# Patient Record
Sex: FEMALE | Race: WHITE | Employment: OTHER | URBAN - METROPOLITAN AREA
[De-identification: names, ages, dates, MRNs, and addresses within clinical notes are randomized per-mention and may not be internally consistent; named-entity substitution may affect disease eponyms.]

---

## 2018-01-09 ENCOUNTER — HOSPITAL ENCOUNTER (EMERGENCY)
Age: 68
Discharge: HOME | End: 2018-01-09
Payer: MEDICARE

## 2018-01-09 ENCOUNTER — APPOINTMENT (OUTPATIENT)
Dept: GENERAL RADIOLOGY | Age: 68
End: 2018-01-09
Payer: MEDICARE

## 2018-01-09 VITALS
SYSTOLIC BLOOD PRESSURE: 120 MMHG | TEMPERATURE: 98.6 F | RESPIRATION RATE: 16 BRPM | WEIGHT: 175 LBS | HEIGHT: 64 IN | HEART RATE: 86 BPM | OXYGEN SATURATION: 96 % | DIASTOLIC BLOOD PRESSURE: 83 MMHG | BODY MASS INDEX: 29.88 KG/M2

## 2018-01-09 DIAGNOSIS — J10.1 INFLUENZA A: Primary | ICD-10-CM

## 2018-01-09 LAB
RAPID INFLUENZA  B AGN: NEGATIVE
RAPID INFLUENZA A AGN: POSITIVE

## 2018-01-09 PROCEDURE — 94640 AIRWAY INHALATION TREATMENT: CPT

## 2018-01-09 PROCEDURE — 99283 EMERGENCY DEPT VISIT LOW MDM: CPT

## 2018-01-09 PROCEDURE — 71046 X-RAY EXAM CHEST 2 VIEWS: CPT

## 2018-01-09 PROCEDURE — 86403 PARTICLE AGGLUT ANTBDY SCRN: CPT

## 2018-01-09 PROCEDURE — 6370000000 HC RX 637 (ALT 250 FOR IP): Performed by: PHYSICIAN ASSISTANT

## 2018-01-09 PROCEDURE — 94760 N-INVAS EAR/PLS OXIMETRY 1: CPT

## 2018-01-09 RX ORDER — GUAIFENESIN AND CODEINE PHOSPHATE 100; 10 MG/5ML; MG/5ML
5 SOLUTION ORAL 3 TIMES DAILY PRN
Qty: 118 ML | Refills: 0 | Status: SHIPPED | OUTPATIENT
Start: 2018-01-09 | End: 2018-01-16

## 2018-01-09 RX ORDER — BENZONATATE 100 MG/1
100 CAPSULE ORAL 3 TIMES DAILY PRN
Qty: 15 CAPSULE | Refills: 0 | Status: SHIPPED | OUTPATIENT
Start: 2018-01-09 | End: 2018-01-16

## 2018-01-09 RX ORDER — MELOXICAM 15 MG/1
15 TABLET ORAL DAILY
COMMUNITY
End: 2021-04-23

## 2018-01-09 RX ORDER — IPRATROPIUM BROMIDE AND ALBUTEROL SULFATE 2.5; .5 MG/3ML; MG/3ML
1 SOLUTION RESPIRATORY (INHALATION) ONCE
Status: COMPLETED | OUTPATIENT
Start: 2018-01-09 | End: 2018-01-09

## 2018-01-09 RX ORDER — ALBUTEROL SULFATE 90 UG/1
AEROSOL, METERED RESPIRATORY (INHALATION)
Qty: 1 INHALER | Refills: 1 | Status: SHIPPED | OUTPATIENT
Start: 2018-01-09 | End: 2021-04-23

## 2018-01-09 RX ORDER — PREDNISONE 10 MG/1
20 TABLET ORAL 2 TIMES DAILY
Qty: 20 TABLET | Refills: 0 | Status: SHIPPED | OUTPATIENT
Start: 2018-01-09 | End: 2018-01-14

## 2018-01-09 RX ORDER — PREDNISONE 20 MG/1
60 TABLET ORAL ONCE
Status: COMPLETED | OUTPATIENT
Start: 2018-01-09 | End: 2018-01-09

## 2018-01-09 RX ADMIN — PREDNISONE 60 MG: 20 TABLET ORAL at 09:35

## 2018-01-09 RX ADMIN — IPRATROPIUM BROMIDE AND ALBUTEROL SULFATE 1 AMPULE: .5; 3 SOLUTION RESPIRATORY (INHALATION) at 09:18

## 2018-01-09 ASSESSMENT — ENCOUNTER SYMPTOMS
NAUSEA: 0
VOMITING: 0
DIARRHEA: 0
ABDOMINAL PAIN: 0
COUGH: 1
SHORTNESS OF BREATH: 0

## 2018-01-09 ASSESSMENT — PULMONARY FUNCTION TESTS: PEFR_L/MIN: 270

## 2018-01-09 NOTE — ED NOTES
Pt given discharge instructions, states understanding, ambulated to exit for discharge     Raciel Barga RN  01/09/18 1014

## 2018-01-09 NOTE — ED NOTES
Reassessed pt lung sounds after breathing tx, exp wheeze noted left lower lung, pt states that she feels a little better and less wheezy after tx     Rene Muhammad RN  01/09/18 2048

## 2019-01-25 ENCOUNTER — HOSPITAL ENCOUNTER (OUTPATIENT)
Dept: WOMENS IMAGING | Age: 69
Discharge: HOME | End: 2019-01-27
Payer: MEDICARE

## 2019-01-25 DIAGNOSIS — Z12.31 ENCOUNTER FOR SCREENING MAMMOGRAM FOR BREAST CANCER: ICD-10-CM

## 2019-01-25 PROCEDURE — 77063 BREAST TOMOSYNTHESIS BI: CPT

## 2019-07-10 LAB
ALT SERPL-CCNC: 26 U/L (ref 7–45)
AST SERPL-CCNC: 23 U/L (ref 9–39)
CHOLESTEROL/HDL RATIO: 2.5
CHOLESTEROL: 156 MG/DL (ref 0–199)
HDLC SERPL-MCNC: 62 MG/DL
LDL CHOLESTEROL: 72 MG/DL (ref 0–99)
TRIGL SERPL-MCNC: 111 MG/DL (ref 0–149)
VLDLC SERPL CALC-MCNC: 22 MG/DL (ref 0–40)

## 2019-07-25 LAB
CREAT SERPL-MCNC: 0.79 MG/DL (ref 0.5–1)
GFR AFRICAN AMERICAN: >60 ML/MIN/1.73M2
GFR NON-AFRICAN AMERICAN: >60 ML/MIN/1.73M2
UREA NITROGEN: 22 MG/DL (ref 6–23)

## 2021-04-13 ENCOUNTER — HOSPITAL ENCOUNTER (EMERGENCY)
Age: 71
Discharge: HOME | End: 2021-04-13
Payer: MEDICARE

## 2021-04-13 ENCOUNTER — APPOINTMENT (OUTPATIENT)
Dept: GENERAL RADIOLOGY | Age: 71
End: 2021-04-13
Payer: MEDICARE

## 2021-04-13 VITALS
HEART RATE: 77 BPM | SYSTOLIC BLOOD PRESSURE: 161 MMHG | RESPIRATION RATE: 17 BRPM | BODY MASS INDEX: 35 KG/M2 | DIASTOLIC BLOOD PRESSURE: 85 MMHG | HEIGHT: 64 IN | WEIGHT: 205 LBS | TEMPERATURE: 98.1 F | OXYGEN SATURATION: 95 %

## 2021-04-13 DIAGNOSIS — S81.812A LACERATION OF LEFT LOWER LEG, INITIAL ENCOUNTER: Primary | ICD-10-CM

## 2021-04-13 PROCEDURE — 99283 EMERGENCY DEPT VISIT LOW MDM: CPT

## 2021-04-13 PROCEDURE — 6370000000 HC RX 637 (ALT 250 FOR IP): Performed by: NURSE PRACTITIONER

## 2021-04-13 PROCEDURE — 73590 X-RAY EXAM OF LOWER LEG: CPT

## 2021-04-13 PROCEDURE — 6360000002 HC RX W HCPCS: Performed by: NURSE PRACTITIONER

## 2021-04-13 PROCEDURE — 90471 IMMUNIZATION ADMIN: CPT | Performed by: NURSE PRACTITIONER

## 2021-04-13 PROCEDURE — 90715 TDAP VACCINE 7 YRS/> IM: CPT | Performed by: NURSE PRACTITIONER

## 2021-04-13 PROCEDURE — 12001 RPR S/N/AX/GEN/TRNK 2.5CM/<: CPT

## 2021-04-13 PROCEDURE — 2580000003 HC RX 258: Performed by: NURSE PRACTITIONER

## 2021-04-13 RX ORDER — AMOXICILLIN AND CLAVULANATE POTASSIUM 875; 125 MG/1; MG/1
1 TABLET, FILM COATED ORAL ONCE
Status: COMPLETED | OUTPATIENT
Start: 2021-04-13 | End: 2021-04-13

## 2021-04-13 RX ORDER — MAGNESIUM HYDROXIDE 1200 MG/15ML
1000 LIQUID ORAL ONCE
Status: COMPLETED | OUTPATIENT
Start: 2021-04-13 | End: 2021-04-13

## 2021-04-13 RX ORDER — AMOXICILLIN AND CLAVULANATE POTASSIUM 875; 125 MG/1; MG/1
1 TABLET, FILM COATED ORAL 2 TIMES DAILY
Qty: 14 TABLET | Refills: 0 | Status: SHIPPED | OUTPATIENT
Start: 2021-04-13 | End: 2021-04-20

## 2021-04-13 RX ADMIN — AMOXICILLIN AND CLAVULANATE POTASSIUM 1 TABLET: 875; 125 TABLET, FILM COATED ORAL at 11:14

## 2021-04-13 RX ADMIN — TETANUS TOXOID, REDUCED DIPHTHERIA TOXOID AND ACELLULAR PERTUSSIS VACCINE, ADSORBED 0.5 ML: 5; 2.5; 8; 8; 2.5 SUSPENSION INTRAMUSCULAR at 11:14

## 2021-04-13 RX ADMIN — Medication 1 EACH: at 11:15

## 2021-04-13 RX ADMIN — SODIUM CHLORIDE 1000 ML: 900 IRRIGANT IRRIGATION at 11:15

## 2021-04-13 ASSESSMENT — ENCOUNTER SYMPTOMS
COUGH: 0
VOMITING: 0
BACK PAIN: 0
ABDOMINAL PAIN: 0
NAUSEA: 0
DIARRHEA: 0
CHEST TIGHTNESS: 0
SORE THROAT: 0
COLOR CHANGE: 0
TROUBLE SWALLOWING: 0
ABDOMINAL DISTENTION: 0
SHORTNESS OF BREATH: 0
SINUS PRESSURE: 0
SINUS PAIN: 0
RHINORRHEA: 0
WHEEZING: 0

## 2021-04-13 NOTE — ED TRIAGE NOTES
Pt states that she was breaking up a wooden flower pot when a piece of it went into her left shin. Pt denies pain but thinks she should get an abx and does not remember the last time she has received a tetanus vaccine.

## 2021-04-13 NOTE — ED PROVIDER NOTES
[facility] ED  EMERGENCY DEPARTMENT ENCOUNTER      Pt Name: Mikki Amaya  MRN: [id number]  [place] 1950  Date of evaluation: 4/13/2021  Provider: JOSTIN Palmer CNP    CHIEF COMPLAINT       Chief Complaint   Patient presents with    Puncture Wound     got a puncture wound from a piece of wood yesterday to lle         HISTORY OF PRESENT ILLNESS   (Location/Symptom, Timing/Onset,Context/Setting, Quality, Duration, Modifying Factors, Severity)  Note limiting factors. Mikki Amaya is a 65 y.o. female who presents to the emergency department for complaint of left lower extremity wound. Patient states that around 5 PM last night she is breaking apart a piece of rotted wood when part of the wood struck her in her shin causing a wound. She states she pulled out a piece of splinters and was able to thoroughly wash the area last night. She states that she did have some bruising and swelling which has gone down this morning. She states that she does have some pain discomfort when the area is pressed on but otherwise there is only a general dull aching very mild in nature. She states that she was able to get most of bleeding controlled. She did wash the area last night flushed it under running water repeatedly. She is concerned because she cannot recall the last time she had a tetanus shot and is afraid that since the area is open that she needs an antibiotic. She is not diabetic. Only small amount of bleeding today into the bandaged area. She states that the swelling and bruising has significantly improved as well as the pain. She presents to the ER for evaluation of the wound a tetanus update and antibiotic medication. Nursing Notes were reviewed. REVIEW OF SYSTEMS    (2-9 systems for level 4, 10 or more for level 5)     Review of Systems   Constitutional: Negative for activity change, appetite change, chills, diaphoresis, fatigue and fever.    HENT: Negative for congestion, ear pain, postnasal drip, rhinorrhea, sinus pressure, sinus pain, sore throat and trouble swallowing. Eyes: Negative for visual disturbance. Respiratory: Negative for cough, chest tightness, shortness of breath and wheezing. Cardiovascular: Negative for chest pain and palpitations. Gastrointestinal: Negative for abdominal distention, abdominal pain, diarrhea, nausea and vomiting. Genitourinary: Negative for difficulty urinating, dysuria, flank pain, frequency and urgency. Musculoskeletal: Positive for myalgias. Negative for arthralgias, back pain, gait problem, joint swelling, neck pain and neck stiffness. Skin: Positive for wound. Negative for color change and rash. Neurological: Negative for dizziness, tremors, seizures, syncope, speech difficulty, weakness, light-headedness, numbness and headaches. Except as noted above the remainder of the review of systems was reviewed and negative. PAST MEDICAL HISTORY     Past Medical History:   Diagnosis Date    Cancer [facility]) 2008    Bladder    Encephalitis 1992    Hyperlipidemia     Osteoporosis     Shingles 1992     Past Surgical History:   Procedure Laterality Date    APPENDECTOMY  1982    COLONOSCOPY  May 14, 2012    screening by Dr Frederick Shankar  2010 2010, cataract, R,  LASIK on both eye, 2007    [facility]    Total    [facility]NEE [address] [facility]    left knee    LASIK  2007    both eyes    LUMBAR FUSION      SPINE SURGERY  89, 92    Lumbar, [facility], [facility]    PERCY AND BSO  1983    fibroids, nonmalignant.     TUBAL LIGATION  1975     Social History     Socioeconomic History    Marital status:      Spouse name: Not on file    Number of children: Not on file    Years of education: Not on file    Highest education level: Not on file   Occupational History    Not on file   Social Needs    Financial resource strain: Not on file    Food insecurity     Worry: Not on file Inability: Not on file    Transportation needs     Medical: Not on file     Non-medical: Not on file   Tobacco Use    Smoking status: Former Smoker     Packs/day: 1.50     Years: 15.00     Pack years: 22.50     Types: Cigarettes     Quit date: 1983     Years since quittin.3    Smokeless tobacco: Never Used   Substance and Sexual Activity    Alcohol use: No    Drug use: No    Sexual activity: Never   Lifestyle    Physical activity     Days per week: Not on file     Minutes per session: Not on file    Stress: Not on file   Relationships    Social connections     Talks on phone: Not on file     Gets together: Not on file     Attends [employer] service: Not on file     Active member of club or organization: Not on file     Attends meetings of clubs or organizations: Not on file     Relationship status: Not on file    Intimate partner violence     Fear of current or ex partner: Not on file     Emotionally abused: Not on file     Physically abused: Not on file     Forced sexual activity: Not on file   Other Topics Concern    Not on file   Social History Narrative    Not on file       SCREENINGS             PHYSICAL EXAM    (up to 7 for level 4, 8 or more for level 5)     ED Triage Vitals [21 1027]   BP Temp Temp Source Pulse Resp SpO2 Height Weight   (!) 161/85 98.1 °F (36.7 °C) Temporal 77 17 95 % 5' 4\" (1.626 m) 205 lb (93 kg)       Physical Exam  Constitutional:       General: She is not in acute distress. Appearance: Normal appearance. She is normal weight. She is not ill-appearing, toxic-appearing or diaphoretic. HENT:      Head: Normocephalic and atraumatic. Right Ear: External ear normal.      Left Ear: External ear normal.   Eyes:      General:         Right eye: No discharge. Left eye: No discharge. Conjunctiva/sclera: Conjunctivae normal.      Pupils: Pupils are equal, round, and reactive to light.    Neck:      Musculoskeletal: Normal range of motion and neck supple. No neck rigidity or muscular tenderness. Cardiovascular:      Rate and Rhythm: Normal rate and regular rhythm. Pulses: Normal pulses. Pulmonary:      Effort: Pulmonary effort is normal.   Musculoskeletal: Normal range of motion. General: Tenderness and signs of injury present. No swelling or deformity. Left knee: Normal.      Left ankle: Normal.        Legs:    Skin:     General: Skin is warm. Capillary Refill: Capillary refill takes less than 2 seconds. Findings: Laceration present. Neurological:      General: No focal deficit present. Mental Status: She is alert. Mental status is at baseline. Cranial Nerves: No cranial nerve deficit. Sensory: No sensory deficit. Motor: No weakness. Coordination: Coordination normal.         RESULTS     EKG: All EKG's are interpreted by the Emergency Department Physician who either signs or Co-signsthis chart in the absence of a cardiologist.        RADIOLOGY:   Vanesa Hernandez such as CT, Ultrasound and MRI are read by the radiologist. Plain radiographic images are visualized and preliminarily interpreted by the emergency physician with the below findings:        Interpretation per the Radiologist below, if available at the time ofthis note:    XR TIBIA FIBULA LEFT (2 VIEWS)   Final Result      No acute osseous abnormality. No radiopaque soft tissue foreign body. ED BEDSIDE ULTRASOUND:   Performed by ED Physician - none    LABS:  Labs Reviewed - No data to display    All other labs were within normal range or not returned as of this dictation. EMERGENCY DEPARTMENT COURSE and DIFFERENTIAL DIAGNOSIS/MDM:   Vitals:    Vitals:    04/13/21 1027   BP: (!) 161/85   Pulse: 77   Resp: 17   Temp: 98.1 °F (36.7 °C)   TempSrc: Temporal   SpO2: 95%   Weight: 205 lb (93 kg)   Height: 5' 4\" (1.626 m)            MDM patient afebrile nontoxic no acute distress hemodynamically stable.   Patient has an extent: no areolar tissue violation noted, no fascia violation noted, no foreign bodies/material noted, no muscle damage noted, no nerve damage noted, no tendon damage noted, no underlying fracture noted and no vascular damage noted      Contaminated: no    Treatment:     Area cleansed with:  Soap and water and saline    Amount of cleaning:  Standard    Irrigation solution:  Sterile saline    Irrigation volume:  1000    Irrigation method:  Pressure wash    Visualized foreign bodies/material removed: no    Skin repair:     Repair method:  Tissue adhesive  Approximation:     Approximation:  Loose  Post-procedure details:     Dressing:  Non-adherent dressing and bulky dressing    Patient tolerance of procedure: Tolerated well, no immediate complications        FINAL IMPRESSION      1.  Laceration of left lower leg, initial encounter          DISPOSITION/PLAN   DISPOSITION        PATIENT REFERRED TO:  [address] [address] [phone]  Call in 2 days  For wound re-check      DISCHARGE MEDICATIONS:  New Prescriptions    AMOXICILLIN-CLAVULANATE (AUGMENTIN) 875-125 MG PER TABLET    Take 1 tablet by mouth 2 times daily for 7 days          (Please notethat portions of this note were completed with a voice recognition program.  Efforts were made to edit the dictations but occasionally words are mis-transcribed.)    JOSTIN Navarrete CNP (electronically signed)  Attending Emergency Physician         JOSTIN Navarrete CNP  04/13/21 5360

## 2021-04-23 ENCOUNTER — HOSPITAL ENCOUNTER (OUTPATIENT)
Dept: WOUND CARE | Age: 71
Discharge: HOME | End: 2021-04-23
Payer: MEDICARE

## 2021-04-23 VITALS
HEART RATE: 98 BPM | RESPIRATION RATE: 16 BRPM | TEMPERATURE: 97.3 F | SYSTOLIC BLOOD PRESSURE: 153 MMHG | DIASTOLIC BLOOD PRESSURE: 83 MMHG

## 2021-04-23 DIAGNOSIS — L02.415 CELLULITIS AND ABSCESS OF RIGHT LEG: ICD-10-CM

## 2021-04-23 DIAGNOSIS — S81.831A: ICD-10-CM

## 2021-04-23 DIAGNOSIS — L03.115 CELLULITIS AND ABSCESS OF RIGHT LEG: ICD-10-CM

## 2021-04-23 PROCEDURE — 11042 DBRDMT SUBQ TIS 1ST 20SQCM/<: CPT

## 2021-04-23 PROCEDURE — 99203 OFFICE O/P NEW LOW 30 MIN: CPT | Performed by: SURGERY

## 2021-04-23 PROCEDURE — 11042 DBRDMT SUBQ TIS 1ST 20SQCM/<: CPT | Performed by: SURGERY

## 2021-04-23 PROCEDURE — 99213 OFFICE O/P EST LOW 20 MIN: CPT

## 2021-04-23 RX ORDER — AMOXICILLIN AND CLAVULANATE POTASSIUM 875; 125 MG/1; MG/1
1 TABLET, FILM COATED ORAL 2 TIMES DAILY
Qty: 28 TABLET | Refills: 0 | Status: SHIPPED | OUTPATIENT
Start: 2021-04-23 | End: 2021-05-07

## 2021-04-23 RX ORDER — CYCLOSPORINE 0 G/ML
2 SOLUTION/ DROPS OPHTHALMIC; TOPICAL
COMMUNITY

## 2021-04-23 NOTE — PROGRESS NOTES
[facility]                                                   Progress Note and Procedure Note      Cong Dawson  MEDICAL RECORD NUMBER:  [id number]  AGE: 65 y.o. GENDER: female  : 1950  EPISODE DATE:  2021    Subjective:     Chief Complaint   Patient presents with    Wound Check     left lower leg          HISTORY of PRESENT ILLNESS HPI     Cong Dawson is a 65 y.o. female who presents today for wound/ulcer evaluation. History of Wound Context: Patient had a puncture wound from a wooden planter on . She presented to the ER  due to pain at the puncture site. Xray was negative. She was started on abx. She states today that the erythema is improving, but she is out of abx. No fever or chills    Wound/Ulcer Pain Timing/Severity: none  Quality of pain: N/A  Severity:  0 / 10   Modifying Factors: None  Associated Signs/Symptoms: erythema and drainage    Ulcer Identification:  Ulcer Type: traumatic  Contributing Factors: obesity    Wound: N/A        PAST MEDICAL HISTORY        Diagnosis Date    Cancer [facility])     Bladder    Encephalitis     Hyperlipidemia     Osteoporosis     Shingles        PAST SURGICAL HISTORY    Past Surgical History:   Procedure Laterality Date    APPENDECTOMY      COLONOSCOPY  May 14, 2012    screening by Dr Melbourne Kehr  2010, cataract, R,  LASIK on both eye,    [address] [facility] [place] [place]    left knee    LASIK  2007    both eyes    LUMBAR FUSION      SPINE SURGERY  89, 92    Lumbar, , Andrea    PERCY AND BSO      fibroids, nonmalignant.  TUBAL LIGATION         FAMILY HISTORY    History reviewed. No pertinent family history.     SOCIAL HISTORY    Social History     Tobacco Use    Smoking status: Former Smoker     Packs/day: 1.50     Years: 15.00     Pack years: 22.50     Types: Cigarettes     Quit date: 1983     Years since quittin.3  Smokeless tobacco: Never Used   Substance Use Topics    Alcohol use: No    Drug use: No       ALLERGIES    No Known Allergies    MEDICATIONS    Current Outpatient Medications on File Prior to Encounter   Medication Sig Dispense Refill    cycloSPORINE, PF, (CEQUA) 0.09 % SOLN Apply 2 drops to eye 2 drops each eye daily      atorvastatin (LIPITOR) 20 MG tablet Take 20 mg by mouth daily. No current facility-administered medications on file prior to encounter. REVIEW OF SYSTEMS    Constitutional: negative  Respiratory: negative  Cardiovascular: negative  Allergic/Immunologic: negative    Objective:      BP (!) 153/83   Pulse 98   Temp 97.3 °F (36.3 °C) (Temporal)   Resp 16     Wt Readings from Last 3 Encounters:   04/13/21 205 lb (93 kg)   01/09/18 175 lb (79.4 kg)   08/04/14 193 lb (87.5 kg)       PHYSICAL EXAM    Constitutional:   Well nourished and well developed. Appears neat and clean. Patient is alert, oriented x3, and in no apparent distress. Respiratory:  Respiratory effort is easy and symmetric bilaterally. Rate is normal at rest and on room air. Vascular:  Pedal Pulses are palpable. Capillary refill is <3 sec to digits bilateral.  Extremities negative for pitting edema. Neurological:   Sensation is normal to lower extremities. Dermatological:  Wound description noted in wound assessment. Wound with surrounding cellulitis. Some drainage at the site. Psychiatric:  Judgement and insight intact. Short and long term memory intact. No evidence of depression, anxiety, or agitation. Patient is calm, cooperative, and communicative. Appropriate interactions and affect.         Assessment:      Active Hospital Problems    Diagnosis Date Noted    Cellulitis and abscess of right leg [L03.115, L02.415] 04/23/2021    Puncture wound w/o foreign body, right lower leg, init [S81.831A] 04/23/2021        Procedure Note  Indications:  Based on my examination of this patient's wound(s)/ulcer(s) today, debridement is required to promote healing and evaluate the wound base. Performed by: Jessica Iraheta MD    Consent obtained:  Yes    Time out taken:  Yes    Pain Control:     Lidocaine 4%    Debridement:Excisional Debridement    Using curette, scissors and forceps the wound(s)/ulcer(s) was/were sharply debrided down through and including the removal of epidermis, dermis and subcutaneous tissue. Devitalized Tissue Debrided:  fibrin, biofilm, slough and necrotic/eschar    Pre Debridement Measurements:  Are located in the [place]  Documentation Flow Sheet    Wound/Ulcer #: 1    Post Debridement Measurements:  Wound/Ulcer Descriptions are Pre Debridement except measurements:    Wound 04/23/21 Leg Left; Lower #1 (Active)   Wound Image   04/23/21 1506   Wound Etiology Traumatic 04/23/21 1506   Wound Cleansed Cleansed with saline 04/23/21 1506   Wound Length (cm) 1.7 cm 04/23/21 1506   Wound Width (cm) 1.7 cm 04/23/21 1506   Wound Depth (cm) 0.1 cm 04/23/21 1506   Wound Surface Area (cm^2) 2.89 cm^2 04/23/21 1506   Wound Volume (cm^3) 0.29 cm^3 04/23/21 1506   Post-Procedure Length (cm) 1.7 cm 04/23/21 1535   Post-Procedure Width (cm) 1.7 cm 04/23/21 1535   Post-Procedure Depth (cm) 0.1 cm 04/23/21 1535   Post-Procedure Surface Area (cm^2) 2.89 cm^2 04/23/21 1535   Post-Procedure Volume (cm^3) 0.29 cm^3 04/23/21 1535   Drainage Amount Small 04/23/21 1506   Drainage Description Serosanguinous 04/23/21 1506   Odor None 04/23/21 1506   Светлана-wound Assessment Intact;Fragile; Warm 04/23/21 1506   Margins Attached edges 04/23/21 1506   Wound Thickness Description not for Pressure Injury Full thickness 04/23/21 1506   Number of days: 0            Percent of Wound/Ulcer Debrided: 100%    Total Surface Area Debrided:    Wound Surface Area (cm^2) 2.89 cm^2     Diabetic/Pressure/Non Pressure Ulcers:  Ulcer: Non-Pressure ulcer, fat layer exposed      Bleeding:  Minimal    Hemostasis Achieved:  by silver nitrate stick    Procedural Pain:  0  / 10     Post Procedural Pain:  0 / 10     Response to treatment:  Well tolerated by patient. Plan:     Cellulitis around puncture wound  Ulcer debrided  rx given for Augmentin  F/u 1 wk. Treatment Note please see attached Discharge Instructions    Written patient dismissal instructions given to patient and signed by patient or POA. Discharge [address] and [facility] Medicine   Physician Orders and Discharge Instructions  [address]  Telephone: [phone]      [phone]      NAME:  Alexis Cheng  YOB: 1950  MEDICAL RECORD NUMBER:  [id number]    Home Care/Facility: None    Wound Location: Left lower leg    Dressing orders: May cleanse with soap and water    2. Apply Cutimed siltec sorbact bordered gauze. 3 Change  Every other day or Monday, Wednesday, and Friday      Compression:    Offloading Device:    Other Instructions: An Antibiotic was called today to your pharmacy - please take as prescribed    Keep all dressings clean, dry and intact. Keep pressure off the wound(s) at all times. Follow up visit 1  Week April 30, 2021 @ 2:15pm    Please give 24 hour notice if unable to keep appointment. [phone]    If you experience any of the following, please call the Wound Care Service at  [phone] or go to the nearest emergency room. *Increase in pain *Temperature over 101 *Increase in drainage from your wound or a foul odor  *Uncontrolled swelling *Need for compression bandage changes due to slippage, breakthrough drainage       PLEASE NOTE: IF YOU ARE UNABLE TO OBTAIN WOUND SUPPLIES, CONTINUE TO USE THE SUPPLIES YOU HAVE AVAILABLE UNTIL YOU ARE ABLE TO REACH US.  IT IS MOST IMPORTANT TO KEEP THE WOUND COVERED AT ALL TIMES        Electronically signed by Kasandra Marino MD on 4/23/2021 at 3:49

## 2021-04-30 ENCOUNTER — HOSPITAL ENCOUNTER (OUTPATIENT)
Dept: WOUND CARE | Age: 71
Discharge: HOME | End: 2021-04-30
Payer: MEDICARE

## 2021-04-30 VITALS
SYSTOLIC BLOOD PRESSURE: 149 MMHG | TEMPERATURE: 97.1 F | HEART RATE: 72 BPM | DIASTOLIC BLOOD PRESSURE: 77 MMHG | RESPIRATION RATE: 18 BRPM

## 2021-04-30 PROCEDURE — 97597 DBRDMT OPN WND 1ST 20 CM/<: CPT

## 2021-04-30 PROCEDURE — 6370000000 HC RX 637 (ALT 250 FOR IP): Performed by: NURSE PRACTITIONER

## 2021-04-30 RX ORDER — LIDOCAINE 40 MG/G
CREAM TOPICAL PRN
Status: DISCONTINUED | OUTPATIENT
Start: 2021-04-30 | End: 2021-05-01 | Stop reason: HOSPADM

## 2021-04-30 RX ADMIN — LIDOCAINE: 40 CREAM TOPICAL at 14:55

## 2021-04-30 ASSESSMENT — PAIN DESCRIPTION - PAIN TYPE: TYPE: ACUTE PAIN

## 2021-04-30 ASSESSMENT — PAIN DESCRIPTION - DESCRIPTORS: DESCRIPTORS: SORE

## 2021-04-30 ASSESSMENT — PAIN DESCRIPTION - FREQUENCY: FREQUENCY: INTERMITTENT

## 2021-04-30 NOTE — PROGRESS NOTES
[facility] Physician Billing Sheet. Shahbaz Danielle  AGE: 65 y.o.    GENDER: female  : 1950  TODAY'S DATE:  2021    ICD-10  [facility] Problems    Diagnosis Date Noted    Puncture wound w/o foreign body, right lower leg, init [S81.831A] 2021       PHYSICIAN PROCEDURES    CPT CODE  51274  05193      Electronically signed by JOSTIN Mohr NP on 2021 at 3:46 PM

## 2021-04-30 NOTE — FLOWSHEET NOTE
[facility],[address]:     [employer] [id number] [facility] [address] [place] [address] [facility] [place] p: [phone] f: [phone]     Ordering Center:     [address] [place] [address]  Abdi Ramirez [phone]  FAX NUMBER [phone]    Patient Information:      Mela Romberg [address]  [facility] [address]   [phone]   : 1950  AGE: 65 y.o. GENDER: female   TODAYS DATE:  2021    Insurance:      PRIMARY INSURANCE:  Plan: MEDICARE PART A AND B  Coverage: MEDICARE  Effective Date: 2015  [id number] - (Medicare)    SECONDARY INSURANCE:  Plan: MEDICAL MUTUAL [address]  Coverage: MEDICAL MUTUAL  Effective Date: 2015  [unfilled]    [unfilled]   [unfilled]     Patient Wound Information:      Problem List Items Addressed This Visit     None       ICD-10 S81.831A      WOUNDS REQUIRING DRESSING SUPPLIES:     Wound 21 Leg Left; Lower #1 (Active)   Wound Image   21 1423   Wound Etiology Traumatic 21 1423   Wound Cleansed Cleansed with saline 21 1423   Dressing/Treatment Cutimed/Sorbact 21 1515   Wound Length (cm) 1.5 cm 21 1423   Wound Width (cm) 1.5 cm 21 1423   Wound Depth (cm) 0.2 cm 21 1423   Wound Surface Area (cm^2) 2.25 cm^2 21 1423   Change in Wound Size % (l*w) 22.15 21 1423   Wound Volume (cm^3) 0.45 cm^3 21 1423   Wound Healing % -55 21 1423   Post-Procedure Length (cm) 1.5 cm 21 1458   Post-Procedure Width (cm) 1.5 cm 21 1458   Post-Procedure Depth (cm) 0.2 cm 21 1458   Post-Procedure Surface Area (cm^2) 2.25 cm^2 21 1458   Post-Procedure Volume (cm^3) 0.45 cm^3 21 1458   Undermining Starts ___ O'Clock 11 21 1423   Undermining Ends___ O'Clock 12 21 1423   Undermining Maxium Distance (cm) 0.2 21 1423   Wound Assessment Slough;Granulation tissue 21 1423   Drainage Amount Moderate 04/30/21 1423   Drainage Description Serosanguinous 04/30/21 1423   Odor None 04/30/21 1423   Светлана-wound Assessment Intact 04/30/21 1423   Margins Defined edges 04/30/21 1423   Wound Thickness Description not for Pressure Injury Full thickness 04/30/21 1423   Number of days: 7          Supplies Requested :      WOUND #: 1   PRIMARY DRESSING:  Other: cutimed Siltec sorbact bordered dressing 3\"x3\"   Cover and Secure with:  Other none     FREQUENCY OF DRESSING CHANGES:  Every other day           ADDITIONAL ITEMS:  [] Gloves Small  [x] Gloves Medium [] Gloves Large [] Gloves XLarge  [] Tape 1\" [x] Tape 2\" [] Tape 3\"  [] Medipore Tape  [x] Saline  [] Skin Prep   [] Adhesive Remover   [] Cotton Tip Applicators   [] Other:    Patient Wound(s) Debrided: [x] Yes   [] No    Debribement Type: dermis    Debridement Date: 4/30/21    Patient currently being seen by Home Health: [] Yes   [x] No    Duration for needed supplies:  []15  [x]30  []60  []90 Days    Provider Information:      PROVIDER'S NAME: Kasandra Marino MD    NPI  [id number]

## 2021-04-30 NOTE — PROGRESS NOTES
Geovanna Moe 37                                                   Progress Note and Procedure Note      Frederick Delcid  MEDICAL RECORD NUMBER:  [id number]  AGE: 65 y.o. GENDER: female  : 1950  EPISODE DATE:  2021    Subjective:     Chief Complaint   Patient presents with    Wound Check     left lower leg         HISTORY of PRESENT ILLNESS HPI     Frederick Delcid is a 65 y.o. female who presents today for wound/ulcer evaluation. History of Wound Context: Patient had a puncture wound from a wooden planter on . She presented to the ER  due to pain at the puncture site. Xray was negative. She was started on abx. Currently on second time for antibiotics. Minimal erythema No drainage or odor today  Wound/Ulcer Pain Timing/Severity: none  Quality of pain: N/A  Severity:  0 / 10   Modifying Factors: None  Associated Signs/Symptoms: erythema and drainage    Ulcer Identification:  Ulcer Type: traumatic  Contributing Factors: obesity    Wound: N/A        PAST MEDICAL HISTORY        Diagnosis Date    Cancer [facility])     Bladder    Encephalitis     Hyperlipidemia     Osteoporosis     Shingles        PAST SURGICAL HISTORY    Past Surgical History:   Procedure Laterality Date    APPENDECTOMY      COLONOSCOPY  May 14, 2012    screening by Dr Mini Crawley  2010, cataract, R,  LASIK on both eye,    [address] [facility]    Total    KNEE [address]    left knee    LASIK      both eyes    LUMBAR FUSION      SPINE SURGERY  89, 92    Lumbar, , Andrea    PERCY AND BSO      fibroids, nonmalignant.  TUBAL LIGATION         FAMILY HISTORY    History reviewed. No pertinent family history.     SOCIAL HISTORY    Social History     Tobacco Use    Smoking status: Former Smoker     Packs/day: 1.50     Years: 15.00     Pack years: 22.50     Types: Cigarettes     Quit date: 1983     Years since quittin.3    Smokeless tobacco: Never Used   Substance Use Topics    Alcohol use: No    Drug use: No       ALLERGIES    No Known Allergies    MEDICATIONS    Current Outpatient Medications on File Prior to Encounter   Medication Sig Dispense Refill    cycloSPORINE, PF, (CEQUA) 0.09 % SOLN Apply 2 drops to eye 2 drops each eye daily      amoxicillin-clavulanate (AUGMENTIN) 875-125 MG per tablet Take 1 tablet by mouth 2 times daily for 14 days 28 tablet 0    atorvastatin (LIPITOR) 20 MG tablet Take 20 mg by mouth daily. No current facility-administered medications on file prior to encounter. REVIEW OF SYSTEMS    Constitutional: negative  Respiratory: negative  Cardiovascular: negative  Allergic/Immunologic: negative    Objective:      BP (!) 149/77   Pulse 72   Temp 97.1 °F (36.2 °C) (Temporal)   Resp 18     Wt Readings from Last 3 Encounters:   04/13/21 205 lb (93 kg)   01/09/18 175 lb (79.4 kg)   08/04/14 193 lb (87.5 kg)       PHYSICAL EXAM    Constitutional:   Well nourished and well developed. Appears neat and clean. Patient is alert, oriented x3, and in no apparent distress. Respiratory:  Respiratory effort is easy and symmetric bilaterally. Rate is normal at rest and on room air. Vascular:  Pedal Pulses are palpable. Capillary refill is <3 sec to digits bilateral.  Extremities negative for pitting edema. Neurological:   Sensation is normal to lower extremities. Dermatological:  Wound description noted in wound assessment. Wound with surrounding cellulitis. Some drainage at the site. Psychiatric:  Judgement and insight intact. Short and long term memory intact. No evidence of depression, anxiety, or agitation. Patient is calm, cooperative, and communicative. Appropriate interactions and affect.         Assessment:      Active Hospital Problems    Diagnosis Date Noted    Puncture wound w/o foreign body, right lower leg, init [S81.831A] 04/23/2021        Procedure Note Indications:  Based on my examination of this patient's wound(s)/ulcer(s) today, debridement is required to promote healing and evaluate the wound base. Performed by: JOSTIN Alejandra NP    Consent obtained:  Yes    Time out taken:  Yes    Pain Control:     Lidocaine 4%    Debridement:Excisional Debridement    Using curette, scissors and forceps the wound(s)/ulcer(s) was/were sharply debrided down through and including the removal of   Devitalized Tissue Debrided:  fibrin, biofilm, slough and necrotic/eschar    Pre Debridement Measurements:  Are located in the [facility]  Documentation Flow Sheet    Wound/Ulcer #: 1    Post Debridement Measurements:  Wound/Ulcer Descriptions are Pre Debridement except measurements:    Wound 04/23/21 Leg Left; Lower #1 (Active)   Wound Image   04/30/21 1423   Wound Etiology Traumatic 04/30/21 1423   Wound Cleansed Cleansed with saline 04/30/21 1423   Dressing/Treatment Cutimed/Sorbact 04/30/21 1515   Wound Length (cm) 1.5 cm 04/30/21 1423   Wound Width (cm) 1.5 cm 04/30/21 1423   Wound Depth (cm) 0.2 cm 04/30/21 1423   Wound Surface Area (cm^2) 2.25 cm^2 04/30/21 1423   Change in Wound Size % (l*w) 22.15 04/30/21 1423   Wound Volume (cm^3) 0.45 cm^3 04/30/21 1423   Wound Healing % -55 04/30/21 1423   Post-Procedure Length (cm) 1.5 cm 04/30/21 1458   Post-Procedure Width (cm) 1.5 cm 04/30/21 1458   Post-Procedure Depth (cm) 0.2 cm 04/30/21 1458   Post-Procedure Surface Area (cm^2) 2.25 cm^2 04/30/21 1458   Post-Procedure Volume (cm^3) 0.45 cm^3 04/30/21 1458   Undermining Starts ___ O'Clock 11 04/30/21 1423   Undermining Ends___ O'Clock 12 04/30/21 1423   Undermining Maxium Distance (cm) 0.2 04/30/21 1423   Wound Assessment Slough;Granulation tissue 04/30/21 1423   Drainage Amount Moderate 04/30/21 1423   Drainage Description Serosanguinous 04/30/21 1423   Odor None 04/30/21 1423   Светлана-wound Assessment Intact 04/30/21 1423   Margins Defined edges 04/30/21 1423   Wound Thickness Description not for Pressure Injury Full thickness 04/30/21 1423   Number of days: 7            Percent of Wound/Ulcer Debrided: 10%  Total Surface Area Debrided:    Wound Surface Area (cm^2) 0.2 cm^2     Diabetic/Pressure/Non Pressure Ulcers:  Ulcer: Non-Pressure ulcer, fat layer exposed      Bleeding:  Minimal    Hemostasis Achieved: not needed  Procedural Pain:  0  / 10     Post Procedural Pain:  0 / 10     Response to treatment:  Well tolerated by patient. Plan:     Cellulitis around puncture wound  Ulcer debrided  rx given for Augmentin  Begin gentle soap and water mechanical debridement   F/U 2 weeks      Treatment Note please see attached Discharge Instructions    Written patient dismissal instructions given to patient and signed by patient or POA. Discharge [address] and Hyperbaric Medicine   Physician Orders and Discharge [address]  Telephone: [phone]        NAME:  Alexandre Singh  YOB: 1950  MEDICAL RECORD NUMBER:  [id number]     Home Care/Facility: None     Wound Location: Left lower leg     Dressing orders: May cleanse with soap and water. Gently rub wound when cleaning.      2. Apply Cutimed siltec sorbact bordered dressing  3 Change  Every other day or Monday, Wednesday, and Friday        Compression:     Offloading Device:     Other Instructions: Complete Antibiotics.      Keep all dressings clean, dry and intact. Keep pressure off the wound(s) at all times.      Follow up visit 2  Weeks May 12 , 2021 @ 10:45 am      Please give 24 hour notice if unable to keep appointment. [phone]     If you experience any of the following, please call the Wound Care Service at  [phone] or go to the nearest emergency room.         *Increase in pain         *Temperature over 101           *Increase in drainage from your wound or a foul odor  *Uncontrolled swelling            *Need for compression bandage changes due to slippage, breakthrough drainage       PLEASE NOTE: IF YOU ARE UNABLE TO OBTAIN WOUND SUPPLIES, CONTINUE TO USE THE SUPPLIES YOU HAVE AVAILABLE UNTIL YOU ARE ABLE TO REACH US.  IT IS MOST IMPORTANT TO KEEP THE WOUND COVERED AT ALL TIMES   Electronically signed by JOSTIN Allen NP on 4/30/2021 at 3:24 PM        Electronically signed by JOSTIN Allen NP on 4/30/2021 at 3:42 PM

## 2021-05-12 ENCOUNTER — HOSPITAL ENCOUNTER (OUTPATIENT)
Dept: WOUND CARE | Age: 71
Discharge: HOME | End: 2021-05-12
Payer: MEDICARE

## 2021-05-12 VITALS
RESPIRATION RATE: 16 BRPM | SYSTOLIC BLOOD PRESSURE: 135 MMHG | TEMPERATURE: 96.9 F | DIASTOLIC BLOOD PRESSURE: 73 MMHG | HEART RATE: 81 BPM

## 2021-05-12 PROCEDURE — 6370000000 HC RX 637 (ALT 250 FOR IP): Performed by: SURGERY

## 2021-05-12 PROCEDURE — 11042 DBRDMT SUBQ TIS 1ST 20SQCM/<: CPT

## 2021-05-12 PROCEDURE — 11042 DBRDMT SUBQ TIS 1ST 20SQCM/<: CPT | Performed by: SURGERY

## 2021-05-12 RX ORDER — MAGNESIUM HYDROXIDE 1200 MG/15ML
LIQUID ORAL
Qty: 1000 ML | Refills: 1 | Status: SHIPPED | OUTPATIENT
Start: 2021-05-12 | End: 2021-05-19

## 2021-05-12 RX ORDER — LIDOCAINE 40 MG/G
CREAM TOPICAL PRN
Status: DISCONTINUED | OUTPATIENT
Start: 2021-05-12 | End: 2021-05-13 | Stop reason: HOSPADM

## 2021-05-12 RX ADMIN — LIDOCAINE: 40 CREAM TOPICAL at 11:00

## 2021-05-12 NOTE — PROGRESS NOTES
Geovanna Moe [address]                                                   Progress Note and Procedure Note      Glenys Florez  MEDICAL RECORD NUMBER:  [id number]  AGE: 65 y.o. GENDER: female  : 1950  EPISODE DATE:  2021    Subjective:     Chief Complaint   Patient presents with    Wound Check     left leg         HISTORY of PRESENT ILLNESS HPI     Glenys Florez is a 65 y.o. female who presents today for wound/ulcer evaluation. History of Wound Context: Patient had a puncture wound from a wooden planter on . She presented to the ER  due to pain at the puncture site. Xray was negative. She was started on abx. No fever or chills    Wound/Ulcer Pain Timing/Severity: none  Quality of pain: N/A  Severity:  0 / 10   Modifying Factors: None  Associated Signs/Symptoms: erythema and drainage    Ulcer Identification:  Ulcer Type: traumatic  Contributing Factors: obesity    Wound: N/A        PAST MEDICAL HISTORY        Diagnosis Date    Cancer [facility])     Bladder    Encephalitis     Hyperlipidemia     Osteoporosis     Shingles        PAST SURGICAL HISTORY    Past Surgical History:   Procedure Laterality Date    APPENDECTOMY      COLONOSCOPY  May 14, 2012    screening by Dr Tessie Morales  2010, cataract, R,  LASIK on both eye,    [address] [facility]    left knee    LASIK  2007    both eyes    LUMBAR FUSION      SPINE SURGERY  89, 92    Lumbar, , Andrea    PERCY AND BSO      fibroids, nonmalignant.  TUBAL LIGATION         FAMILY HISTORY    History reviewed. No pertinent family history. SOCIAL HISTORY    Social History     Tobacco Use    Smoking status: Former Smoker     Packs/day: 1.50     Years: 15.00     Pack years: 22.50     Types: Cigarettes     Quit date: 1983     Years since quittin.3    Smokeless tobacco: Never Used   Substance Use Topics    Alcohol use:  No Time out taken:  Yes    Pain Control:     Lidocaine 4%    Debridement:Excisional Debridement    Using curette, scissors and forceps the wound(s)/ulcer(s) was/were sharply debrided down through and including the removal of epidermis, dermis and subcutaneous tissue. Devitalized Tissue Debrided:  fibrin, biofilm, slough and necrotic/eschar    Pre Debridement Measurements:  Are located in the [facility]  Documentation Flow Sheet    Wound/Ulcer #: 1    Post Debridement Measurements:  Wound/Ulcer Descriptions are Pre Debridement except measurements:    Wound 04/23/21 Leg Left; Lower #1 (Active)   Wound Image   05/12/21 1049   Wound Etiology Traumatic 05/12/21 1049   Wound Cleansed Cleansed with saline 05/12/21 1049   Dressing/Treatment Cutimed/Sorbact 04/30/21 1515   Wound Length (cm) 1.5 cm 05/12/21 1049   Wound Width (cm) 1.2 cm 05/12/21 1049   Wound Depth (cm) 0.3 cm 05/12/21 1049   Wound Surface Area (cm^2) 1.8 cm^2 05/12/21 1049   Change in Wound Size % (l*w) 37.72 05/12/21 1049   Wound Volume (cm^3) 0.54 cm^3 05/12/21 1049   Wound Healing % -86 05/12/21 1049   Post-Procedure Length (cm) 1.5 cm 05/12/21 1119   Post-Procedure Width (cm) 1.2 cm 05/12/21 1119   Post-Procedure Depth (cm) 0.3 cm 05/12/21 1119   Post-Procedure Surface Area (cm^2) 1.8 cm^2 05/12/21 1119   Post-Procedure Volume (cm^3) 0.54 cm^3 05/12/21 1119   Undermining Starts ___ O'Clock 11 04/30/21 1423   Undermining Ends___ O'Clock 12 04/30/21 1423   Undermining Maxium Distance (cm) 0.2 04/30/21 1423   Wound Assessment Pink/red;Slough 05/12/21 1049   Drainage Amount Moderate 05/12/21 1049   Drainage Description Yellow;Brown 05/12/21 1049   Odor None 05/12/21 1049   Светлана-wound Assessment Intact 04/30/21 1423   Margins Defined edges 04/30/21 1423   Wound Thickness Description not for Pressure Injury Full thickness 05/12/21 1049   Number of days: 18            Percent of Wound/Ulcer Debrided: 100%    Total Surface Area Debrided:    Wound Surface Area (cm^2) 1.8 cm^2     Diabetic/Pressure/Non Pressure Ulcers:  Ulcer: Non-Pressure ulcer, fat layer exposed      Bleeding:  Minimal    Hemostasis Achieved:  by pressure    Procedural Pain:  0  / 10     Post Procedural Pain:  0 / 10     Response to treatment:  Well tolerated by patient. Plan:     Ulcer debrided  rx given for santyl  F/u 2 wk. Treatment Note please see attached Discharge Instructions    Written patient dismissal instructions given to patient and signed by patient or POA. Discharge [address] and Hyper[facility]ic Medicine   Physician Orders and Discharge [address]  [address] [facility]  [place], [facility]  Telephone: [phone]      [phone]        NAME: Ayana Goodson  DATE OF BIRTH:  1950  MEDICAL RECORD NUMBER:  [id number]     Home Care/Facility: None     Wound Location: Left lower leg   Continue use the Cutimed Siltec Sorbact Bordered dressing. Change every other day. When you obtain the Santyl do the following dressing order:    Dressing orders:   . 1. Cleanse wound(s) with normal saline and gently rub wound bed   2. Apply a nickel thickness layer of SANTYL OINTMENT or PLUROGEL to the wound bed for   enzymatic debridement purposes. 3. Apply a moistened saline 4x4 (gauze pad) over the Santyl Ointment only or DSD over Plurogel. 4. Cover with additional dry 4x4's and wrap with gauze (solo or kerlix). 5. Change Every Day.       Compression:     Offloading Device:     Other Instructions: May shower     Keep all dressings clean, dry and intact.  Keep pressure off the wound(s) at all times.      Follow up visit 2  Weeks May 26 , 2021 @ 11:00 am      Please give 24 hour notice if unable to keep appointment.  [phone]     If you experience any of the following, please call the Wound Care Service at  [phone] or go to the nearest emergency room.        *Increase in pain         *Temperature over 101           *Increase in drainage from your wound or a foul odor  *Uncontrolled swelling            *Need for compression bandage changes due to slippage, breakthrough drainage       PLEASE NOTE: IF YOU ARE UNABLE TO OBTAIN WOUND SUPPLIES, CONTINUE TO USE THE SUPPLIES YOU HAVE AVAILABLE UNTIL YOU ARE ABLE TO REACH US.  IT IS MOST IMPORTANT TO KEEP THE WOUND COVERED AT ALL TIMES      Electronically signed by Vidal Smart MD on 5/12/2021 at 11:32 AM          Electronically signed by Vidal Smart MD on 5/12/2021 at 11:32 AM

## 2021-05-26 ENCOUNTER — HOSPITAL ENCOUNTER (OUTPATIENT)
Dept: WOUND CARE | Age: 71
Discharge: HOME | End: 2021-05-26
Payer: MEDICARE

## 2021-05-26 VITALS
RESPIRATION RATE: 16 BRPM | TEMPERATURE: 96 F | SYSTOLIC BLOOD PRESSURE: 117 MMHG | HEART RATE: 84 BPM | DIASTOLIC BLOOD PRESSURE: 62 MMHG

## 2021-05-26 DIAGNOSIS — L97.912 CHRONIC ULCER OF RIGHT LEG WITH FAT LAYER EXPOSED (HCC): ICD-10-CM

## 2021-05-26 DIAGNOSIS — L03.115 CELLULITIS AND ABSCESS OF RIGHT LEG: Primary | ICD-10-CM

## 2021-05-26 DIAGNOSIS — L02.415 CELLULITIS AND ABSCESS OF RIGHT LEG: Primary | ICD-10-CM

## 2021-05-26 PROCEDURE — 11042 DBRDMT SUBQ TIS 1ST 20SQCM/<: CPT | Performed by: SURGERY

## 2021-05-26 PROCEDURE — 6370000000 HC RX 637 (ALT 250 FOR IP): Performed by: SURGERY

## 2021-05-26 PROCEDURE — 11042 DBRDMT SUBQ TIS 1ST 20SQCM/<: CPT

## 2021-05-26 RX ORDER — CLOBETASOL PROPIONATE 0.5 MG/G
OINTMENT TOPICAL ONCE
Status: CANCELLED | OUTPATIENT
Start: 2021-05-26 | End: 2021-05-26

## 2021-05-26 RX ORDER — BACITRACIN ZINC AND POLYMYXIN B SULFATE 500; 1000 [USP'U]/G; [USP'U]/G
OINTMENT TOPICAL ONCE
Status: CANCELLED | OUTPATIENT
Start: 2021-05-26 | End: 2021-05-26

## 2021-05-26 RX ORDER — GENTAMICIN SULFATE 1 MG/G
OINTMENT TOPICAL ONCE
Status: CANCELLED | OUTPATIENT
Start: 2021-05-26 | End: 2021-05-26

## 2021-05-26 RX ORDER — LIDOCAINE 40 MG/G
CREAM TOPICAL ONCE
Status: CANCELLED | OUTPATIENT
Start: 2021-05-26 | End: 2021-05-26

## 2021-05-26 RX ORDER — LIDOCAINE 50 MG/G
OINTMENT TOPICAL ONCE
Status: CANCELLED | OUTPATIENT
Start: 2021-05-26 | End: 2021-05-26

## 2021-05-26 RX ORDER — LIDOCAINE HYDROCHLORIDE 20 MG/ML
JELLY TOPICAL ONCE
Status: CANCELLED | OUTPATIENT
Start: 2021-05-26 | End: 2021-05-26

## 2021-05-26 RX ORDER — LIDOCAINE HYDROCHLORIDE 40 MG/ML
SOLUTION TOPICAL ONCE
Status: CANCELLED | OUTPATIENT
Start: 2021-05-26 | End: 2021-05-26

## 2021-05-26 RX ORDER — BACITRACIN, NEOMYCIN, POLYMYXIN B 400; 3.5; 5 [USP'U]/G; MG/G; [USP'U]/G
OINTMENT TOPICAL ONCE
Status: CANCELLED | OUTPATIENT
Start: 2021-05-26 | End: 2021-05-26

## 2021-05-26 RX ORDER — GINSENG 100 MG
CAPSULE ORAL ONCE
Status: CANCELLED | OUTPATIENT
Start: 2021-05-26 | End: 2021-05-26

## 2021-05-26 RX ORDER — BETAMETHASONE DIPROPIONATE 0.05 %
OINTMENT (GRAM) TOPICAL ONCE
Status: CANCELLED | OUTPATIENT
Start: 2021-05-26 | End: 2021-05-26

## 2021-05-26 RX ORDER — LIDOCAINE 40 MG/G
CREAM TOPICAL ONCE
Status: COMPLETED | OUTPATIENT
Start: 2021-05-26 | End: 2021-05-26

## 2021-05-26 RX ADMIN — LIDOCAINE: 40 CREAM TOPICAL at 11:21

## 2021-05-26 NOTE — PROGRESS NOTES
Geovanna Moe 37                                                   Progress Note and Procedure Note      Cong Dawson  MEDICAL RECORD NUMBER:  [id number]  AGE: 65 y.o. GENDER: female  : 1950  EPISODE DATE:  2021    Subjective:     Chief Complaint   Patient presents with    Wound Check     left leg wound recheck         HISTORY of PRESENT ILLNESS HPI     Cong Dawson is a 65 y.o. female who presents today for wound/ulcer evaluation. History of Wound Context: Patient had a puncture wound from a wooden planter on . She presented to the ER  due to pain at the puncture site. Xray was negative. She was started on abx. No fever or chills    Wound/Ulcer Pain Timing/Severity: none  Quality of pain: N/A  Severity:  0 / 10   Modifying Factors: None  Associated Signs/Symptoms: erythema and drainage    Ulcer Identification:  Ulcer Type: traumatic  Contributing Factors: obesity    Wound: N/A        PAST MEDICAL HISTORY        Diagnosis Date    Cancer [facility])     Bladder    Encephalitis     Hyperlipidemia     Osteoporosis     Shingles        PAST SURGICAL HISTORY    Past Surgical History:   Procedure Laterality Date    APPENDECTOMY      COLONOSCOPY  May 14, 2012    screening by Dr Melbourne Kehr  2010, cataract, R,  LASIK on both eye,    [address] [facility]    left knee    LASIK  2007    both eyes    LUMBAR FUSION      SPINE SURGERY  89, 92    Lumbar, [facility], Andrea    PERCY AND BSO      fibroids, nonmalignant.  TUBAL LIGATION         FAMILY HISTORY    History reviewed. No pertinent family history.     SOCIAL HISTORY    Social History     Tobacco Use    Smoking status: Former Smoker     Packs/day: 1.50     Years: 15.00     Pack years: 22.50     Types: Cigarettes     Quit date: 1983     Years since quittin.4    Smokeless tobacco: Never Used   Substance Use Topics    Alcohol use: No    Drug use: No       ALLERGIES    Allergies   Allergen Reactions    Adhesive Tape Rash       MEDICATIONS    Current Outpatient Medications on File Prior to Encounter   Medication Sig Dispense Refill    cycloSPORINE, PF, (CEQUA) 0.09 % SOLN Apply 2 drops to eye 2 drops each eye daily      atorvastatin (LIPITOR) 20 MG tablet Take 20 mg by mouth daily. No current facility-administered medications on file prior to encounter. REVIEW OF SYSTEMS    Constitutional: negative  Respiratory: negative  Cardiovascular: negative  Allergic/Immunologic: negative    Objective:      /62   Pulse 84   Temp 96 °F (35.6 °C) (Temporal)   Resp 16     Wt Readings from Last 3 Encounters:   04/13/21 205 lb (93 kg)   01/09/18 175 lb (79.4 kg)   08/04/14 193 lb (87.5 kg)       PHYSICAL EXAM    Constitutional:   Well nourished and well developed. Appears neat and clean. Patient is alert, oriented x3, and in no apparent distress. Respiratory:  Respiratory effort is easy and symmetric bilaterally. Rate is normal at rest and on room air. Vascular:  Pedal Pulses are palpable. Capillary refill is <3 sec to digits bilateral.  Extremities negative for pitting edema. Neurological:   Sensation is normal to lower extremities. Dermatological:  Wound description noted in wound assessment. No erythema. Minimal drainage. Psychiatric:  Judgement and insight intact. Short and long term memory intact. No evidence of depression, anxiety, or agitation. Patient is calm, cooperative, and communicative. Appropriate interactions and affect. Assessment:      Active Hospital Problems    Diagnosis Date Noted    Chronic ulcer of right leg with fat layer exposed [facility]) [L97.912] 05/26/2021        Procedure Note  Indications:  Based on my examination of this patient's wound(s)/ulcer(s) today, debridement is required to promote healing and evaluate the wound base.     Performed by: Nasima Almeida MD Surface Area Debrided:    Wound Surface Area (cm^2) 0.45 cm^2         Diabetic/Pressure/Non Pressure Ulcers:  Ulcer: Non-Pressure ulcer, fat layer exposed      Bleeding:  Minimal    Hemostasis Achieved:  by silver nitrate stick    Procedural Pain:  0  / 10     Post Procedural Pain:  0 / 10     Response to treatment:  Well tolerated by patient. Plan:     Ulcer debrided  rx given for santyl  F/u 2 wks. Treatment Note please see attached Discharge Instructions    Written patient dismissal instructions given to patient and signed by patient or POA. Discharge [address] and Hyper[facility]ic Medicine   Physician Orders and Discharge [address], [address] [place] [address]  Telephone: [phone]      [phone]        NAME: Girish Voss  DATE OF BIRTH:  1950  MEDICAL RECORD NUMBER:  [id number]     Home Care/Facility: None     Wound Location: Left lower leg   Continue use the Cutimed Siltec Sorbact Bordered dressing. Change every other day. When you obtain the Santyl do the following dressing order:     Dressing orders:   . 1. Cleanse wound(s) with normal saline and gently rub wound bed   2. Apply a nickel thickness layer of SANTYL OINTMENT or PLUROGEL to the wound bed for   enzymatic debridement purposes. 3. Apply a moistened saline 4x4 (gauze pad) over the Santyl Ointment only or DSD over Plurogel. 4. Cover with additional dry 4x4's and wrap with gauze (solo or kerlix). 5. Change Every Day.       Compression:     Offloading Device:     Other Instructions: May shower     Keep all dressings clean, dry and intact.  Keep pressure off the wound(s) at all times.      Follow up visit 2  Weeks June 9 , 2021 @  am      Please give 24 hour notice if unable to keep appointment.  [phone]     If you experience any of the following, please call the Wound Care Service at  [phone] or go to the nearest emergency room.        *Increase in pain         *Temperature over 101           *Increase in drainage from your wound or a foul odor  *Uncontrolled swelling            *Need for compression bandage changes due to slippage, breakthrough drainage       PLEASE NOTE: IF YOU ARE UNABLE TO OBTAIN WOUND SUPPLIES, CONTINUE TO USE THE SUPPLIES YOU HAVE AVAILABLE UNTIL YOU ARE ABLE TO REACH US.  IT IS MOST IMPORTANT TO KEEP THE WOUND COVERED AT ALL TIMES    Electronically signed by Elmarie Sandifer, MD on 5/26/2021 at 11:38 AM          Electronically signed by Elmarie Sandifer, MD on 5/26/2021 at 11:38 AM

## 2021-06-09 ENCOUNTER — HOSPITAL ENCOUNTER (OUTPATIENT)
Dept: WOUND CARE | Age: 71
Discharge: HOME | End: 2021-06-09
Payer: MEDICARE

## 2021-06-09 VITALS
SYSTOLIC BLOOD PRESSURE: 141 MMHG | TEMPERATURE: 96.8 F | RESPIRATION RATE: 16 BRPM | HEART RATE: 91 BPM | DIASTOLIC BLOOD PRESSURE: 70 MMHG

## 2021-06-09 DIAGNOSIS — L02.415 CELLULITIS AND ABSCESS OF RIGHT LEG: Primary | ICD-10-CM

## 2021-06-09 DIAGNOSIS — L03.115 CELLULITIS AND ABSCESS OF RIGHT LEG: Primary | ICD-10-CM

## 2021-06-09 PROCEDURE — 99213 OFFICE O/P EST LOW 20 MIN: CPT | Performed by: SURGERY

## 2021-06-09 PROCEDURE — 99212 OFFICE O/P EST SF 10 MIN: CPT

## 2021-06-09 RX ORDER — GINSENG 100 MG
CAPSULE ORAL ONCE
Status: CANCELLED | OUTPATIENT
Start: 2021-06-09 | End: 2021-06-09

## 2021-06-09 RX ORDER — BETAMETHASONE DIPROPIONATE 0.05 %
OINTMENT (GRAM) TOPICAL ONCE
Status: CANCELLED | OUTPATIENT
Start: 2021-06-09 | End: 2021-06-09

## 2021-06-09 RX ORDER — BACITRACIN, NEOMYCIN, POLYMYXIN B 400; 3.5; 5 [USP'U]/G; MG/G; [USP'U]/G
OINTMENT TOPICAL ONCE
Status: CANCELLED | OUTPATIENT
Start: 2021-06-09 | End: 2021-06-09

## 2021-06-09 RX ORDER — LIDOCAINE HYDROCHLORIDE 20 MG/ML
JELLY TOPICAL ONCE
Status: CANCELLED | OUTPATIENT
Start: 2021-06-09 | End: 2021-06-09

## 2021-06-09 RX ORDER — LIDOCAINE 40 MG/G
CREAM TOPICAL ONCE
Status: CANCELLED | OUTPATIENT
Start: 2021-06-09 | End: 2021-06-09

## 2021-06-09 RX ORDER — GENTAMICIN SULFATE 1 MG/G
OINTMENT TOPICAL ONCE
Status: CANCELLED | OUTPATIENT
Start: 2021-06-09 | End: 2021-06-09

## 2021-06-09 RX ORDER — LIDOCAINE 50 MG/G
OINTMENT TOPICAL ONCE
Status: CANCELLED | OUTPATIENT
Start: 2021-06-09 | End: 2021-06-09

## 2021-06-09 RX ORDER — CLOBETASOL PROPIONATE 0.5 MG/G
OINTMENT TOPICAL ONCE
Status: CANCELLED | OUTPATIENT
Start: 2021-06-09 | End: 2021-06-09

## 2021-06-09 RX ORDER — LIDOCAINE HYDROCHLORIDE 40 MG/ML
SOLUTION TOPICAL ONCE
Status: CANCELLED | OUTPATIENT
Start: 2021-06-09 | End: 2021-06-09

## 2021-06-09 RX ORDER — BACITRACIN ZINC AND POLYMYXIN B SULFATE 500; 1000 [USP'U]/G; [USP'U]/G
OINTMENT TOPICAL ONCE
Status: CANCELLED | OUTPATIENT
Start: 2021-06-09 | End: 2021-06-09

## 2021-06-09 NOTE — PROGRESS NOTES
Geovanna Moe [address]                                                   Progress Note and Procedure Note      Justina Christopher  MEDICAL RECORD NUMBER:  [id number]  AGE: 65 y.o. GENDER: female  : 1950  EPISODE DATE:  2021    Subjective:     Chief Complaint   Patient presents with    Wound Check     left leg wound recheck         HISTORY of PRESENT ILLNESS HPI     Justina Christopher is a 65 y.o. female who presents today for wound/ulcer evaluation. History of Wound Context: Patient had a puncture wound from a wooden planter on . She presented to the ER  due to pain at the puncture site. Xray was negative. She was started on abx. No fever or chills    Wound/Ulcer Pain Timing/Severity: none  Quality of pain: N/A  Severity:  0 / 10   Modifying Factors: None  Associated Signs/Symptoms: erythema and drainage    Ulcer Identification:  Ulcer Type: traumatic  Contributing Factors: obesity    Wound: N/A        PAST MEDICAL HISTORY        Diagnosis Date    Cancer [facility])     Bladder    Encephalitis     Hyperlipidemia     Osteoporosis     Shingles        PAST SURGICAL HISTORY    Past Surgical History:   Procedure Laterality Date    APPENDECTOMY      COLONOSCOPY  May 14, 2012    screening by Dr Nani Mustafa  2010, cataract, R,  LASIK on both eye,    [address] [facility]    left knee    LASIK  2007    both eyes    LUMBAR FUSION      SPINE SURGERY  89, 92    Lumbar, , Andrea    PERCY AND BSO      fibroids, nonmalignant.  TUBAL LIGATION         FAMILY HISTORY    History reviewed. No pertinent family history.     SOCIAL HISTORY    Social History     Tobacco Use    Smoking status: Former Smoker     Packs/day: 1.50     Years: 15.00     Pack years: 22.50     Types: Cigarettes     Quit date: 1983     Years since quittin.4    Smokeless tobacco: Never Used   Substance Use Topics    Alcohol use: No    Drug use: No       ALLERGIES    Allergies   Allergen Reactions    Adhesive Tape Rash       MEDICATIONS    Current Outpatient Medications on File Prior to Encounter   Medication Sig Dispense Refill    cycloSPORINE, PF, (CEQUA) 0.09 % SOLN Apply 2 drops to eye 2 drops each eye daily      atorvastatin (LIPITOR) 20 MG tablet Take 20 mg by mouth daily. No current facility-administered medications on file prior to encounter. REVIEW OF SYSTEMS    Constitutional: negative  Respiratory: negative  Cardiovascular: negative  Allergic/Immunologic: negative    Objective:      BP (!) 141/70   Pulse 91   Temp 96.8 °F (36 °C) (Temporal)   Resp 16     Wt Readings from Last 3 Encounters:   04/13/21 205 lb (93 kg)   01/09/18 175 lb (79.4 kg)   08/04/14 193 lb (87.5 kg)       PHYSICAL EXAM    Constitutional:   Well nourished and well developed. Appears neat and clean. Patient is alert, oriented x3, and in no apparent distress. Respiratory:  Respiratory effort is easy and symmetric bilaterally. Rate is normal at rest and on room air. Vascular:  Pedal Pulses are palpable. Capillary refill is <3 sec to digits bilateral.  Extremities negative for pitting edema. Neurological:   Sensation is normal to lower extremities. Dermatological:  Wound description noted in wound assessment. No erythema. Minimal drainage. Psychiatric:  Judgement and insight intact. Short and long term memory intact. No evidence of depression, anxiety, or agitation. Patient is calm, cooperative, and communicative. Appropriate interactions and affect. Assessment:      Active Hospital Problems    Diagnosis Date Noted    Chronic ulcer of right leg with fat layer exposed [facility]) [L97.912] 05/26/2021        Post Debridement Measurements:  Wound/Ulcer Descriptions are Pre Debridement except measurements:    Wound 04/23/21 Leg Left; Lower #1 (Active)   Wound Image   06/09/21 1036   Wound Etiology Traumatic 06/09/21 1036   Wound Cleansed Cleansed with saline 06/09/21 1036   Dressing/Treatment Cutimed/Sorbact 04/30/21 1515   Wound Length (cm) 0.7 cm 06/09/21 1036   Wound Width (cm) 0.5 cm 06/09/21 1036   Wound Depth (cm) 0.1 cm 06/09/21 1036   Wound Surface Area (cm^2) 0.35 cm^2 06/09/21 1036   Change in Wound Size % (l*w) 87.89 06/09/21 1036   Wound Volume (cm^3) 0.04 cm^3 06/09/21 1036   Wound Healing % 86 06/09/21 1036   Post-Procedure Length (cm) 0.9 cm 05/26/21 1133   Post-Procedure Width (cm) 0.5 cm 05/26/21 1133   Post-Procedure Depth (cm) 0.3 cm 05/26/21 1133   Post-Procedure Surface Area (cm^2) 0.45 cm^2 05/26/21 1133   Post-Procedure Volume (cm^3) 0.14 cm^3 05/26/21 1133   Undermining Starts ___ O'Clock 11 04/30/21 1423   Undermining Ends___ O'Clock 12 04/30/21 1423   Undermining Maxium Distance (cm) 0.2 04/30/21 1423   Wound Assessment Granulation tissue 06/09/21 1036   Drainage Amount Small 06/09/21 1036   Drainage Description Serosanguinous 06/09/21 1036   Odor None 06/09/21 1036   Светлана-wound Assessment Intact 06/09/21 1036   Margins Defined edges 06/09/21 1036   Wound Thickness Description not for Pressure Injury Full thickness 06/09/21 1036   Number of days: 46            Plan:       cont santyl  Ulcer healing well. F/u 2 wks. Treatment Note please see attached Discharge Instructions    Written patient dismissal instructions given to patient and signed by patient or POA. Discharge [address] and Hyperbaric Medicine   Physician Orders and Discharge [address]  [address] [facility]  Guillermo Camarillo, [address] [place] [address]  Telephone: [phone]      [phone]        NAME: Mihir Looney  DATE OF BIRTH:  1950  MEDICAL RECORD NUMBER:  [id number]     Home Care/Facility: None     Wound Location: Left lower leg   Dressing orders:   . 1. Cleanse wound(s) with normal saline and gently rub wound bed   2.  Apply a nickel thickness layer of SANTYL OINTMENT or PLUROGEL to the wound bed for   enzymatic debridement purposes. 3. Apply a moistened saline 4x4 (gauze pad) over the Santyl Ointment only or DSD over Plurogel. 4. Cover with additional dry 4x4's and wrap with gauze (solo or kerlix). 5. Change Every Day.       Compression:     Offloading Device:     Other Instructions: May shower     Keep all dressings clean, dry and intact.  Keep pressure off the wound(s) at all times.      Follow up visit 2  Weeks June 23, 2021 @      Please give 24 hour notice if unable to keep appointment. [phone]     If you experience any of the following, please call the Wound Care Service at  [phone] or go to the nearest emergency room.        *Increase in pain         *Temperature over 101           *Increase in drainage from your wound or a foul odor  *Uncontrolled swelling            *Need for compression bandage changes due to slippage, breakthrough drainage       PLEASE NOTE: IF YOU ARE UNABLE TO OBTAIN WOUND SUPPLIES, CONTINUE TO USE THE SUPPLIES YOU HAVE AVAILABLE UNTIL YOU ARE ABLE TO [address].  IT IS MOST IMPORTANT TO KEEP THE WOUND COVERED AT ALL TIMES    Electronically signed by Pily Resendiz MD on 6/9/2021 at 11:05 AM             Electronically signed by Pily Resendiz MD on 6/9/2021 at 11:05 AM

## 2021-06-23 ENCOUNTER — HOSPITAL ENCOUNTER (OUTPATIENT)
Dept: WOUND CARE | Age: 71
Discharge: HOME | End: 2021-06-23
Payer: MEDICARE

## 2021-06-23 VITALS
SYSTOLIC BLOOD PRESSURE: 137 MMHG | DIASTOLIC BLOOD PRESSURE: 72 MMHG | HEART RATE: 81 BPM | RESPIRATION RATE: 18 BRPM | TEMPERATURE: 96.2 F

## 2021-06-23 DIAGNOSIS — L02.415 CELLULITIS AND ABSCESS OF RIGHT LEG: Primary | ICD-10-CM

## 2021-06-23 DIAGNOSIS — L03.115 CELLULITIS AND ABSCESS OF RIGHT LEG: Primary | ICD-10-CM

## 2021-06-23 PROCEDURE — 99213 OFFICE O/P EST LOW 20 MIN: CPT

## 2021-06-23 PROCEDURE — 99213 OFFICE O/P EST LOW 20 MIN: CPT | Performed by: SURGERY

## 2021-06-23 RX ORDER — GENTAMICIN SULFATE 1 MG/G
OINTMENT TOPICAL ONCE
Status: CANCELLED | OUTPATIENT
Start: 2021-06-23 | End: 2021-06-23

## 2021-06-23 RX ORDER — LIDOCAINE 40 MG/G
CREAM TOPICAL ONCE
Status: CANCELLED | OUTPATIENT
Start: 2021-06-23 | End: 2021-06-23

## 2021-06-23 RX ORDER — LIDOCAINE HYDROCHLORIDE 20 MG/ML
JELLY TOPICAL ONCE
Status: CANCELLED | OUTPATIENT
Start: 2021-06-23 | End: 2021-06-23

## 2021-06-23 RX ORDER — BACITRACIN ZINC AND POLYMYXIN B SULFATE 500; 1000 [USP'U]/G; [USP'U]/G
OINTMENT TOPICAL ONCE
Status: CANCELLED | OUTPATIENT
Start: 2021-06-23 | End: 2021-06-23

## 2021-06-23 RX ORDER — EZETIMIBE 10 MG/1
10 TABLET ORAL DAILY
COMMUNITY

## 2021-06-23 RX ORDER — BACITRACIN, NEOMYCIN, POLYMYXIN B 400; 3.5; 5 [USP'U]/G; MG/G; [USP'U]/G
OINTMENT TOPICAL ONCE
Status: CANCELLED | OUTPATIENT
Start: 2021-06-23 | End: 2021-06-23

## 2021-06-23 RX ORDER — CLOBETASOL PROPIONATE 0.5 MG/G
OINTMENT TOPICAL ONCE
Status: CANCELLED | OUTPATIENT
Start: 2021-06-23 | End: 2021-06-23

## 2021-06-23 RX ORDER — BETAMETHASONE DIPROPIONATE 0.05 %
OINTMENT (GRAM) TOPICAL ONCE
Status: CANCELLED | OUTPATIENT
Start: 2021-06-23 | End: 2021-06-23

## 2021-06-23 RX ORDER — GINSENG 100 MG
CAPSULE ORAL ONCE
Status: CANCELLED | OUTPATIENT
Start: 2021-06-23 | End: 2021-06-23

## 2021-06-23 RX ORDER — LIDOCAINE HYDROCHLORIDE 40 MG/ML
SOLUTION TOPICAL ONCE
Status: CANCELLED | OUTPATIENT
Start: 2021-06-23 | End: 2021-06-23

## 2021-06-23 RX ORDER — LIDOCAINE 50 MG/G
OINTMENT TOPICAL ONCE
Status: CANCELLED | OUTPATIENT
Start: 2021-06-23 | End: 2021-06-23

## 2021-06-23 NOTE — PLAN OF CARE
Problem: Pain:  Description: Pain management should include both nonpharmacologic and pharmacologic interventions. Goal: Pain level will decrease  Outcome: Ongoing     Problem: Pain:  Description: Pain management should include both nonpharmacologic and pharmacologic interventions.   Goal: Control of acute pain  Outcome: Ongoing     Problem: Wound:  Goal: Will show signs of wound healing; wound closure and no evidence of infection  Outcome: Ongoing

## 2021-06-23 NOTE — PROGRESS NOTES
Geovanna Moe [address]                                                   Progress Note and Procedure Note      Chelsea Eugene  MEDICAL RECORD NUMBER:  [id number]  AGE: 65 y.o. GENDER: female  : 1950  EPISODE DATE:  2021    Subjective:     Chief Complaint   Patient presents with    Wound Check     LLL         HISTORY of PRESENT ILLNESS HPI     Chelsea Eugene is a 65 y.o. female who presents today for wound/ulcer evaluation. History of Wound Context: Patient had a puncture wound from a wooden planter on . She presented to the ER  due to pain at the puncture site. Xray was negative. She was started on abx. No fever or chills    Wound/Ulcer Pain Timing/Severity: none  Quality of pain: N/A  Severity:  0 / 10   Modifying Factors: None  Associated Signs/Symptoms: erythema and drainage    Ulcer Identification:  Ulcer Type: traumatic  Contributing Factors: obesity    Wound: N/A        PAST MEDICAL HISTORY        Diagnosis Date    Cancer [facility])     Bladder    Encephalitis     Hyperlipidemia     Osteoporosis     Shingles        PAST SURGICAL HISTORY    Past Surgical History:   Procedure Laterality Date    APPENDECTOMY      COLONOSCOPY  May 14, 2012    screening by Dr Edie Amato  2010, cataract, R,  [place] on both eye,    [address] [facility]    [place]    LASIK  2007    both eyes    LUMBAR FUSION      SPINE SURGERY  89, 92    Lumbar, [facility], Andrea    PERCY AND BSO      fibroids, nonmalignant.  TUBAL LIGATION         FAMILY HISTORY    History reviewed. No pertinent family history.     SOCIAL HISTORY    Social History     Tobacco Use    Smoking status: Former Smoker     Packs/day: 1.50     Years: 15.00     Pack years: 22.50     Types: Cigarettes     Quit date: 1983     Years since quittin.5    Smokeless tobacco: Never Used   Substance Use Topics    Alcohol use: No    Drug use: No       ALLERGIES    Allergies   Allergen Reactions    Adhesive Tape Rash       MEDICATIONS    Current Outpatient Medications on File Prior to Encounter   Medication Sig Dispense Refill    ezetimibe (ZETIA) 10 MG tablet Take 10 mg by mouth daily      cycloSPORINE, PF, (CEQUA) 0.09 % SOLN Apply 2 drops to eye 2 drops each eye daily      atorvastatin (LIPITOR) 20 MG tablet Take 20 mg by mouth daily. No current facility-administered medications on file prior to encounter. REVIEW OF SYSTEMS    Constitutional: negative  Respiratory: negative  Cardiovascular: negative  Allergic/Immunologic: negative    Objective:      /72   Pulse 81   Temp 96.2 °F (35.7 °C) (Temporal)   Resp 18     Wt Readings from Last 3 Encounters:   04/13/21 205 lb (93 kg)   01/09/18 175 lb (79.4 kg)   08/04/14 193 lb (87.5 kg)       PHYSICAL EXAM    Constitutional:   Well nourished and well developed. Appears neat and clean. Patient is alert, oriented x3, and in no apparent distress. Respiratory:  Respiratory effort is easy and symmetric bilaterally. Rate is normal at rest and on room air. Vascular:  Pedal Pulses are palpable. Capillary refill is <3 sec to digits bilateral.  Extremities negative for pitting edema. Neurological:   Sensation is normal to lower extremities. Dermatological:  Wound description noted in wound assessment. No erythema. Minimal drainage. Psychiatric:  Judgement and insight intact. Short and long term memory intact. No evidence of depression, anxiety, or agitation. Patient is calm, cooperative, and communicative. Appropriate interactions and affect. Assessment:      Active Hospital Problems    Diagnosis Date Noted    Chronic ulcer of right leg with fat layer exposed [facility]) [L97.912] 05/26/2021        Post Debridement Measurements:  Wound/Ulcer Descriptions are Pre Debridement except measurements:    Wound 04/23/21 Leg Left; Lower #1 (Active)   Wound Image 06/23/21 1008   Wound Etiology Traumatic 06/23/21 1008   Wound Cleansed Cleansed with saline 06/23/21 1008   Dressing/Treatment Cutimed/Sorbact 04/30/21 1515   Wound Length (cm) 0.3 cm 06/23/21 1008   Wound Width (cm) 0.3 cm 06/23/21 1008   Wound Depth (cm) 0.1 cm 06/23/21 1008   Wound Surface Area (cm^2) 0.09 cm^2 06/23/21 1008   Change in Wound Size % (l*w) 96.89 06/23/21 1008   Wound Volume (cm^3) 0.01 cm^3 06/23/21 1008   Wound Healing % 97 06/23/21 1008   Post-Procedure Length (cm) 0.9 cm 05/26/21 1133   Post-Procedure Width (cm) 0.5 cm 05/26/21 1133   Post-Procedure Depth (cm) 0.3 cm 05/26/21 1133   Post-Procedure Surface Area (cm^2) 0.45 cm^2 05/26/21 1133   Post-Procedure Volume (cm^3) 0.14 cm^3 05/26/21 1133   Undermining Starts ___ O'Clock 11 04/30/21 1423   Undermining Ends___ O'Clock 12 04/30/21 1423   Undermining Maxium Distance (cm) 0.2 04/30/21 1423   Wound Assessment Epithelialization 06/23/21 1008   Drainage Amount Scant 06/23/21 1008   Drainage Description Sanguinous 06/23/21 1008   Odor None 06/23/21 1008   Светлана-wound Assessment Intact 06/23/21 1008   Margins Defined edges 06/23/21 1008   Wound Thickness Description not for Pressure Injury Full thickness 06/23/21 1008   Number of days: 60            Plan:       Start bunny  Ulcer healing well. F/u 2 wks. Treatment Note please see attached Discharge Instructions    Written patient dismissal instructions given to patient and signed by patient or POA. Discharge [address] and Hyperbaric Medicine   Physician Orders and Discharge [address]  Telephone: [phone]      [phone]        NAME: Fabricio Dickey  DATE OF BIRTH:  1950  MEDICAL RECORD NUMBER:  [id number]     Home Care/Facility: None     Wound Location: Left lower leg   Dressing orders: . 1. Cleanse wound(s) with normal saline.   2. Apply dry BUNNY  Or

## 2021-07-07 ENCOUNTER — TELEPHONE (OUTPATIENT)
Dept: WOUND CARE | Age: 71
End: 2021-07-07

## 2021-08-16 ENCOUNTER — HOSPITAL ENCOUNTER (EMERGENCY)
Age: 71
Discharge: HOME | End: 2021-08-16
Payer: MEDICARE

## 2021-08-16 VITALS
OXYGEN SATURATION: 96 % | SYSTOLIC BLOOD PRESSURE: 155 MMHG | TEMPERATURE: 97.3 F | RESPIRATION RATE: 14 BRPM | HEART RATE: 93 BPM | DIASTOLIC BLOOD PRESSURE: 85 MMHG

## 2021-08-16 DIAGNOSIS — H11.31 SUBCONJUNCTIVAL HEMORRHAGE OF RIGHT EYE: Primary | ICD-10-CM

## 2021-08-16 PROCEDURE — 99282 EMERGENCY DEPT VISIT SF MDM: CPT

## 2021-08-16 ASSESSMENT — PAIN SCALES - GENERAL
PAINLEVEL_OUTOF10: 1
PAINLEVEL_OUTOF10: 1

## 2021-08-16 ASSESSMENT — PAIN - FUNCTIONAL ASSESSMENT: PAIN_FUNCTIONAL_ASSESSMENT: 0-10

## 2021-08-16 ASSESSMENT — VISUAL ACUITY: OU: 1

## 2021-08-16 ASSESSMENT — ENCOUNTER SYMPTOMS
EYE PAIN: 0
EYE REDNESS: 1
COUGH: 0
SHORTNESS OF BREATH: 0
EYE ITCHING: 0
EYE DISCHARGE: 0
BACK PAIN: 0
ABDOMINAL PAIN: 0
PHOTOPHOBIA: 0

## 2021-08-16 ASSESSMENT — PAIN DESCRIPTION - LOCATION
LOCATION: EYE
LOCATION: EYE

## 2021-08-16 ASSESSMENT — PAIN DESCRIPTION - ORIENTATION: ORIENTATION: RIGHT

## 2021-08-16 ASSESSMENT — PAIN DESCRIPTION - PAIN TYPE
TYPE: ACUTE PAIN
TYPE: ACUTE PAIN

## 2021-08-16 NOTE — ED NOTES
Discharge instructions reviewed with patient including follow up with [facility]. Verbalized understanding. Grazyna Novoa RN  08/16/21 3425

## 2021-08-16 NOTE — ED PROVIDER NOTES
[facility] ED  eMERGENCY dEPARTMENT eNCOUnter      Pt Name: Vira Swenson  MRN: [id number]  [place] 1950  Date of evaluation: 8/16/2021  Provider: Claude Alice, APRN - CNP      HISTORY OF PRESENT ILLNESS    Vira Swenson is a 65 y.o. female who presents to the Emergency Department with R eye redness since yesterday. Patient states she was driving yesterday and felt something in her eye, looked up and eye was red. NO pain in the eye, no visual changes. Patient denies recenet coughing, sneezing or vomiting, no trauma to the eye. REVIEW OF SYSTEMS       Review of Systems   Constitutional: Negative for activity change, appetite change and fever. HENT: Negative for congestion. Eyes: Positive for redness. Negative for photophobia, pain, discharge, itching and visual disturbance. Respiratory: Negative for cough and shortness of breath. Cardiovascular: Negative for chest pain. Gastrointestinal: Negative for abdominal pain. Genitourinary: Negative for dysuria. Musculoskeletal: Negative for arthralgias and back pain. Skin: Negative for rash. All other systems reviewed and are negative. PAST MEDICAL HISTORY     Past Medical History:   Diagnosis Date    Cancer [facility]) 2008    Bladder    Encephalitis 1992    Hyperlipidemia     Osteoporosis     Shingles 1992         SURGICAL HISTORY       Past Surgical History:   Procedure Laterality Date    APPENDECTOMY  1982    COLONOSCOPY  May 14, 2012    screening by Dr Bj Tee  2010 2010, cataract, R,  LASIK on both eye, 2007    [address] [place] [address]    Total    [facility] [address] [facility]    left knee    LASIK  2007    both eyes    LUMBAR FUSION      SPINE SURGERY  89, 92    Lumbar, , Andrea    PERCY AND BSO  1983    fibroids, nonmalignant.     TUBAL LIGATION  1975         CURRENT MEDICATIONS       Previous Medications    ATORVASTATIN (LIPITOR) 20 MG TABLET    Take 20 mg by mouth daily.      CYCLOSPORINE, PF, (CEQUA) 0.09 % SOLN    Apply 2 drops to eye 2 drops each eye daily    EZETIMIBE (ZETIA) 10 MG TABLET    Take 10 mg by mouth daily       ALLERGIES     Adhesive tape    FAMILY HISTORY     No family history on file. SOCIAL HISTORY       Social History     Socioeconomic History    Marital status:      Spouse name: Not on file    Number of children: Not on file    Years of education: Not on file    Highest education level: Not on file   Occupational History    Not on file   Tobacco Use    Smoking status: Former Smoker     Packs/day: 1.50     Years: 15.00     Pack years: 22.50     Types: Cigarettes     Quit date: 1983     Years since quittin.6    Smokeless tobacco: Never Used   Substance and Sexual Activity    Alcohol use: No    Drug use: No    Sexual activity: Never   Other Topics Concern    Not on file   Social History Narrative    Not on file     Social Determinants of Health     Financial Resource Strain:     Difficulty of Paying Living Expenses:    Food Insecurity:     Worried About Running Out of Food in the Last Year:     [address] in the Last Year:    Transportation Needs:     Lack of Transportation (Medical):      Lack of Transportation (Non-Medical):    Physical Activity:     Days of Exercise per Week:     Minutes of Exercise per Session:    Stress:     Feeling of Stress :    Social Connections:     Frequency of Communication with Friends and Family:     Frequency of Social Gatherings with Friends and Family:     Attends [employer] Services:     Active Member of Clubs or Organizations:     Attends Club or Organization Meetings:     Marital Status:    Intimate Partner Violence:     Fear of Current or Ex-Partner:     Emotionally Abused:     Physically Abused:     Sexually Abused:        SCREENINGS      @FLOW([id number])@      PHYSICAL EXAM    (up to 7 for level 4, 8 or more for level 5)     ED Triage Vitals [21 1114]   BP Temp Temp src Pulse Resp SpO2 Height Weight   (!) 155/85 97.3 °F (36.3 °C) -- 93 14 96 % -- --       Physical Exam  Vitals and nursing note reviewed. Constitutional:       Appearance: She is well-developed. HENT:      Head: Normocephalic and atraumatic. Right Ear: Hearing and external ear normal.      Left Ear: Hearing and external ear normal.      Nose: Nose normal.      Mouth/Throat:      Lips: Pink. Mouth: Mucous membranes are moist.   Eyes:      General: Lids are normal. Lids are everted, no foreign bodies appreciated. Vision grossly intact. Gaze aligned appropriately. Right eye: No foreign body, discharge or hordeolum. Extraocular Movements: Extraocular movements intact. Conjunctiva/sclera:      Right eye: Chemosis and hemorrhage present. No exudate. Pupils: Pupils are equal, round, and reactive to light. Cardiovascular:      Rate and Rhythm: Normal rate and regular rhythm. Pulmonary:      Effort: Pulmonary effort is normal.      Breath sounds: Normal breath sounds. Abdominal:      General: Bowel sounds are normal. There is no distension. Palpations: Abdomen is soft. Tenderness: There is no abdominal tenderness. Musculoskeletal:         General: Normal range of motion. Cervical back: Normal range of motion and neck supple. Skin:     General: Skin is warm and dry. Neurological:      Mental Status: She is alert and oriented to person, place, and time. Deep Tendon Reflexes: Reflexes are normal and symmetric. Psychiatric:         Judgment: Judgment normal.           All other labs were within normal range or not returned as of this dictation. EMERGENCY DEPARTMENT COURSE and DIFFERENTIALDIAGNOSIS/MDM:   Vitals:    Vitals:    08/16/21 1114   BP: (!) 155/85   Pulse: 93   Resp: 14   Temp: 97.3 °F (36.3 °C)   SpO2: 96%            65 yr old female with R subconjunctival hemorrhage. F/U With [facility] as needed. Patient verbalizes understanding. PROCEDURES:  Unless otherwise noted below, none     Procedures      FINAL IMPRESSION      1.  Subconjunctival hemorrhage of right eye          DISPOSITION/PLAN   DISPOSITION Decision To Discharge 08/16/2021 11:33:50 AM          JOSTIN Khan CNP (electronically signed)  Attending Emergency Physician     JOSTIN Khan CNP  08/16/21 1136

## 2022-02-17 ENCOUNTER — OFFICE VISIT (OUTPATIENT)
Dept: GASTROENTEROLOGY | Age: 72
End: 2022-02-17
Payer: MEDICARE

## 2022-02-17 VITALS — WEIGHT: 204 LBS | BODY MASS INDEX: 34.83 KG/M2 | HEIGHT: 64 IN

## 2022-02-17 DIAGNOSIS — K62.5 RECTAL BLEEDING: Primary | ICD-10-CM

## 2022-02-17 PROCEDURE — G8484 FLU IMMUNIZE NO ADMIN: HCPCS | Performed by: SPECIALIST

## 2022-02-17 PROCEDURE — 1090F PRES/ABSN URINE INCON ASSESS: CPT | Performed by: SPECIALIST

## 2022-02-17 PROCEDURE — 3017F COLORECTAL CA SCREEN DOC REV: CPT | Performed by: SPECIALIST

## 2022-02-17 PROCEDURE — 1123F ACP DISCUSS/DSCN MKR DOCD: CPT | Performed by: SPECIALIST

## 2022-02-17 PROCEDURE — 4040F PNEUMOC VAC/ADMIN/RCVD: CPT | Performed by: SPECIALIST

## 2022-02-17 PROCEDURE — 1036F TOBACCO NON-USER: CPT | Performed by: SPECIALIST

## 2022-02-17 PROCEDURE — G8400 PT W/DXA NO RESULTS DOC: HCPCS | Performed by: SPECIALIST

## 2022-02-17 PROCEDURE — G8417 CALC BMI ABV UP PARAM F/U: HCPCS | Performed by: SPECIALIST

## 2022-02-17 PROCEDURE — 99204 OFFICE O/P NEW MOD 45 MIN: CPT | Performed by: SPECIALIST

## 2022-02-17 PROCEDURE — G8427 DOCREV CUR MEDS BY ELIG CLIN: HCPCS | Performed by: SPECIALIST

## 2022-02-17 RX ORDER — UBIDECARENONE 100 MG
100 CAPSULE ORAL 2 TIMES DAILY
COMMUNITY

## 2022-02-17 RX ORDER — POLYETHYLENE GLYCOL 3350, SODIUM CHLORIDE, SODIUM BICARBONATE, POTASSIUM CHLORIDE 420; 11.2; 5.72; 1.48 G/4L; G/4L; G/4L; G/4L
4000 POWDER, FOR SOLUTION ORAL ONCE
Qty: 4000 ML | Refills: 0 | Status: SHIPPED | OUTPATIENT
Start: 2022-02-17 | End: 2022-02-17

## 2022-02-17 RX ORDER — ESTRADIOL 0.1 MG/G
1 CREAM VAGINAL
COMMUNITY
Start: 2021-07-01

## 2022-02-17 ASSESSMENT — ENCOUNTER SYMPTOMS
RECTAL PAIN: 0
ABDOMINAL PAIN: 0
VOMITING: 0
ANAL BLEEDING: 0
NAUSEA: 0
ABDOMINAL DISTENTION: 0
BLOOD IN STOOL: 1
DIARRHEA: 0
RESPIRATORY NEGATIVE: 1
CONSTIPATION: 1
EYES NEGATIVE: 1

## 2022-03-15 ENCOUNTER — ANESTHESIA EVENT (OUTPATIENT)
Dept: ENDOSCOPY | Age: 72
End: 2022-03-15
Payer: MEDICARE

## 2022-03-15 ENCOUNTER — HOSPITAL ENCOUNTER (OUTPATIENT)
Age: 72
Setting detail: OUTPATIENT SURGERY
Discharge: HOME | End: 2022-03-15
Attending: SPECIALIST | Admitting: SPECIALIST
Payer: MEDICARE

## 2022-03-15 ENCOUNTER — ANESTHESIA (OUTPATIENT)
Dept: ENDOSCOPY | Age: 72
End: 2022-03-15
Payer: MEDICARE

## 2022-03-15 ENCOUNTER — ANCILLARY PROCEDURE (OUTPATIENT)
Dept: ENDOSCOPY | Age: 72
End: 2022-03-15
Attending: SPECIALIST
Payer: MEDICARE

## 2022-03-15 VITALS
DIASTOLIC BLOOD PRESSURE: 81 MMHG | BODY MASS INDEX: 33.29 KG/M2 | HEART RATE: 62 BPM | SYSTOLIC BLOOD PRESSURE: 115 MMHG | RESPIRATION RATE: 16 BRPM | WEIGHT: 195 LBS | OXYGEN SATURATION: 97 % | TEMPERATURE: 97.3 F | HEIGHT: 64 IN

## 2022-03-15 VITALS
SYSTOLIC BLOOD PRESSURE: 110 MMHG | OXYGEN SATURATION: 92 % | RESPIRATION RATE: 16 BRPM | DIASTOLIC BLOOD PRESSURE: 56 MMHG

## 2022-03-15 PROCEDURE — 3700000001 HC ADD 15 MINUTES (ANESTHESIA): Performed by: SPECIALIST

## 2022-03-15 PROCEDURE — 2580000003 HC RX 258: Performed by: SPECIALIST

## 2022-03-15 PROCEDURE — 7100000011 HC PHASE II RECOVERY - ADDTL 15 MIN: Performed by: SPECIALIST

## 2022-03-15 PROCEDURE — 3609027000 HC COLONOSCOPY: Performed by: SPECIALIST

## 2022-03-15 PROCEDURE — 6360000002 HC RX W HCPCS: Performed by: NURSE ANESTHETIST, CERTIFIED REGISTERED

## 2022-03-15 PROCEDURE — 7100000010 HC PHASE II RECOVERY - FIRST 15 MIN: Performed by: SPECIALIST

## 2022-03-15 PROCEDURE — 2709999900 HC NON-CHARGEABLE SUPPLY: Performed by: SPECIALIST

## 2022-03-15 PROCEDURE — 45378 DIAGNOSTIC COLONOSCOPY: CPT | Performed by: SPECIALIST

## 2022-03-15 PROCEDURE — 3700000000 HC ANESTHESIA ATTENDED CARE: Performed by: SPECIALIST

## 2022-03-15 PROCEDURE — 2500000003 HC RX 250 WO HCPCS: Performed by: NURSE ANESTHETIST, CERTIFIED REGISTERED

## 2022-03-15 PROCEDURE — 2580000003 HC RX 258

## 2022-03-15 PROCEDURE — 6370000000 HC RX 637 (ALT 250 FOR IP): Performed by: SPECIALIST

## 2022-03-15 RX ORDER — SODIUM CHLORIDE 9 MG/ML
25 INJECTION, SOLUTION INTRAVENOUS PRN
Status: DISCONTINUED | OUTPATIENT
Start: 2022-03-15 | End: 2022-03-15 | Stop reason: HOSPADM

## 2022-03-15 RX ORDER — SODIUM CHLORIDE 9 MG/ML
INJECTION, SOLUTION INTRAVENOUS
Status: COMPLETED
Start: 2022-03-15 | End: 2022-03-15

## 2022-03-15 RX ORDER — MAGNESIUM HYDROXIDE 1200 MG/15ML
LIQUID ORAL PRN
Status: DISCONTINUED | OUTPATIENT
Start: 2022-03-15 | End: 2022-03-15 | Stop reason: ALTCHOICE

## 2022-03-15 RX ORDER — LIDOCAINE HYDROCHLORIDE 20 MG/ML
INJECTION, SOLUTION INFILTRATION; PERINEURAL PRN
Status: DISCONTINUED | OUTPATIENT
Start: 2022-03-15 | End: 2022-03-15 | Stop reason: SDUPTHER

## 2022-03-15 RX ORDER — SODIUM CHLORIDE 0.9 % (FLUSH) 0.9 %
5-40 SYRINGE (ML) INJECTION EVERY 12 HOURS SCHEDULED
Status: DISCONTINUED | OUTPATIENT
Start: 2022-03-15 | End: 2022-03-15 | Stop reason: HOSPADM

## 2022-03-15 RX ORDER — SODIUM CHLORIDE 9 MG/ML
INJECTION, SOLUTION INTRAVENOUS CONTINUOUS
Status: DISCONTINUED | OUTPATIENT
Start: 2022-03-15 | End: 2022-03-15 | Stop reason: HOSPADM

## 2022-03-15 RX ORDER — SIMETHICONE 20 MG/.3ML
EMULSION ORAL PRN
Status: DISCONTINUED | OUTPATIENT
Start: 2022-03-15 | End: 2022-03-15 | Stop reason: ALTCHOICE

## 2022-03-15 RX ORDER — PROPOFOL 10 MG/ML
INJECTION, EMULSION INTRAVENOUS PRN
Status: DISCONTINUED | OUTPATIENT
Start: 2022-03-15 | End: 2022-03-15 | Stop reason: SDUPTHER

## 2022-03-15 RX ORDER — SODIUM CHLORIDE 0.9 % (FLUSH) 0.9 %
5-40 SYRINGE (ML) INJECTION PRN
Status: DISCONTINUED | OUTPATIENT
Start: 2022-03-15 | End: 2022-03-15 | Stop reason: HOSPADM

## 2022-03-15 RX ADMIN — SODIUM CHLORIDE: 9 INJECTION, SOLUTION INTRAVENOUS at 09:13

## 2022-03-15 RX ADMIN — LIDOCAINE HYDROCHLORIDE 60 MG: 20 INJECTION, SOLUTION INFILTRATION; PERINEURAL at 09:23

## 2022-03-15 RX ADMIN — PROPOFOL 350 MG: 10 INJECTION, EMULSION INTRAVENOUS at 09:23

## 2022-03-15 NOTE — ANESTHESIA PRE PROCEDURE
Department of Anesthesiology  Preprocedure Note       Name:  Romario Jane   Age:  66 y.o.  :  1950                                          MRN:  [id number]         Date:  3/15/2022      Surgeon: Ila Vora):  Sheridan Milner MD    Procedure: Procedure(s):  COLONOSCOPY DIAGNOSTIC    Medications prior to admission:   Prior to Admission medications    Medication Sig Start Date End Date Taking? Authorizing Provider   estradiol (ESTRACE) 0.1 MG/GM vaginal cream Place 1 g vaginally Twice a Week 21   Historical Provider, MD   coenzyme Q10 100 MG CAPS capsule Take 100 mg by mouth 2 times daily    Historical Provider, MD   ezetimibe (ZETIA) 10 MG tablet Take 10 mg by mouth daily    Historical Provider, MD   cycloSPORINE, PF, (CEQUA) 0.09 % SOLN Apply 2 drops to eye 2 drops each eye daily    Historical Provider, MD   atorvastatin (LIPITOR) 20 MG tablet Take 20 mg by mouth daily. Historical Provider, MD       Current medications:    No current facility-administered medications for this encounter. Current Outpatient Medications   Medication Sig Dispense Refill    estradiol (ESTRACE) 0.1 MG/GM vaginal cream Place 1 g vaginally Twice a Week      coenzyme Q10 100 MG CAPS capsule Take 100 mg by mouth 2 times daily      ezetimibe (ZETIA) 10 MG tablet Take 10 mg by mouth daily      cycloSPORINE, PF, (CEQUA) 0.09 % SOLN Apply 2 drops to eye 2 drops each eye daily      atorvastatin (LIPITOR) 20 MG tablet Take 20 mg by mouth daily. Allergies:     Allergies   Allergen Reactions    Adhesive Tape Rash       Problem List:    Patient Active Problem List   Diagnosis Code    Family history of premature coronary heart disease Z82.49    Leukopenia D72.819    Hypercholesterolemia E78.00    Bladder cancer ([facility] [address] C67.9    Cellulitis and abscess of right leg L03.115, L02.415    Puncture wound w/o foreign body, right lower leg, init S81.831A    Chronic ulcer of right leg with fat layer exposed ([address] P82.318 Past Medical History:        Diagnosis Date    Cancer [facility]) 2008    Bladder    Encephalitis 1992    Hyperlipidemia     Osteoporosis     Shingles 1992       Past Surgical History:        Procedure Laterality Date    APPENDECTOMY  1982    COLONOSCOPY  May 14, 2012    screening by Dr Leonor Jacobs  2010, cataract, R,  LASIK on both eye,     [facility]    Total   [facility]    left knee    LASIK  2007    both eyes    LUMBAR FUSION      SPINE SURGERY  89, 92    Lumbar, [facility], [facility]    PERCY AND BSO      fibroids, nonmalignant.  TUBAL LIGATION         Social History:    Social History     Tobacco Use    Smoking status: Former Smoker     Packs/day: 1.50     Years: 15.00     Pack years: 22.50     Types: Cigarettes     Quit date: 1983     Years since quittin.2    Smokeless tobacco: Never Used   Substance Use Topics    Alcohol use: No                                Counseling given: Not Answered      Vital Signs (Current): There were no vitals filed for this visit.                                            BP Readings from Last 3 Encounters:   21 (!) 155/85   21 137/72   21 (!) 141/70       NPO Status:                                                                                 BMI:   Wt Readings from Last 3 Encounters:   22 204 lb (92.5 kg)   21 205 lb (93 kg)   18 175 lb (79.4 kg)     There is no height or weight on file to calculate BMI.    CBC:   Lab Results   Component Value Date    WBC 3.8 2014    RBC 4.55 2014    RBC 4.06 2012    HGB 14.6 2014    HCT 41.5 2014    MCV 91.2 2014    RDW 13.1 2014     2014       CMP:   Lab Results   Component Value Date     2018    K 4.3 2018     2018    CO2 25 2015    BUN 14 2015    CREATININE 0.79 2019    GFRAA >60 2019    AGRATIO 1.7 04/25/2018    LABGLOM >60 07/25/2019    GLUCOSE 103 04/25/2018    PROT 7.2 04/25/2018    CALCIUM 9.8 04/25/2018    BILITOT 1.0 04/25/2018    ALKPHOS 75 04/25/2018    AST 23 07/10/2019    ALT 26 07/10/2019       POC Tests: No results for input(s): POCGLU, POCNA, POCK, POCCL, POCBUN, POCHEMO, POCHCT in the last 72 hours. Coags: No results found for: PROTIME, INR, APTT    HCG (If Applicable): No results found for: PREGTESTUR, PREGSERUM, HCG, HCGQUANT     ABGs: No results found for: PHART, PO2ART, YTV5MKX, LTL6FNR, BEART, O2AFAQCA     Type & Screen (If Applicable):  No results found for: LABABO, LABRH    Drug/Infectious Status (If Applicable):  No results found for: HIV, HEPCAB    COVID-19 Screening (If Applicable): No results found for: COVID19        Anesthesia Evaluation    Airway: Mallampati: II  TM distance: >3 FB   Neck ROM: full  Mouth opening: > = 3 FB Dental:          Pulmonary:Negative Pulmonary ROS and normal exam                               Cardiovascular:    (+) hyperlipidemia        Rhythm: regular  Rate: normal                    Neuro/Psych:   Negative Neuro/Psych ROS              GI/Hepatic/Renal:   (+) bowel prep, morbid obesity          Endo/Other: Negative Endo/Other ROS                    Abdominal:   (+) obese,           Vascular: negative vascular ROS. Other Findings:             Anesthesia Plan      MAC     ASA 2       Induction: intravenous. Anesthetic plan and risks discussed with patient. Plan discussed with attending.                   JOSTIN Kaba - CRNA   3/15/2022

## 2022-03-15 NOTE — ANESTHESIA POSTPROCEDURE EVALUATION
Department of Anesthesiology  Postprocedure Note    Patient: Damian Herzog  MRN: [id number]  YOB: 1950  Date of evaluation: 3/15/2022  Time:  9:44 AM     Procedure Summary     Date: 03/15/22 Room / Location: [address] [place] [address]    Anesthesia Start: 0920 Anesthesia Stop:     Procedure: COLONOSCOPY DIAGNOSTIC (N/A ) Diagnosis: (rectal bleeding;  K62.5)    Surgeons: Aparna Kaur MD Responsible Provider: JOSTIN Serna CRNA    Anesthesia Type: MAC ASA Status: 2          Anesthesia Type: No value filed. Ileana Phase I: Ileana Score: 10    Ileana Phase II:      Last vitals: Reviewed and per EMR flowsheets.        Anesthesia Post Evaluation    Patient location during evaluation: bedside  Patient participation: complete - patient participated  Level of consciousness: awake and awake and alert  Pain score: 0  Airway patency: patent  Nausea & Vomiting: no nausea and no vomiting  Complications: no  Cardiovascular status: blood pressure returned to baseline and hemodynamically stable  Respiratory status: acceptable and spontaneous ventilation  Hydration status: euvolemic

## 2022-05-05 DIAGNOSIS — M17.12 PRIMARY OSTEOARTHRITIS OF LEFT KNEE: Primary | ICD-10-CM

## 2022-07-15 ENCOUNTER — HOSPITAL ENCOUNTER (OUTPATIENT)
Dept: CT IMAGING | Age: 72
Discharge: HOME | End: 2022-07-17
Payer: MEDICARE

## 2022-07-15 DIAGNOSIS — G45.9 TIA (TRANSIENT ISCHEMIC ATTACK): ICD-10-CM

## 2022-07-15 PROCEDURE — 70450 CT HEAD/BRAIN W/O DYE: CPT

## 2022-08-05 PROBLEM — G45.9 TRANSIENT ISCHEMIC ATTACK: Status: ACTIVE | Noted: 2022-08-05

## 2022-08-05 PROBLEM — E66.9 OBESITY: Status: ACTIVE | Noted: 2022-08-05

## 2022-08-05 PROBLEM — R53.83 FATIGUE: Status: ACTIVE | Noted: 2022-08-05

## 2022-08-05 PROBLEM — R26.9 GAIT DIFFICULTY: Status: ACTIVE | Noted: 2018-08-13

## 2022-08-05 PROBLEM — N81.11 CYSTOCELE, MIDLINE: Status: ACTIVE | Noted: 2021-03-25

## 2022-08-05 PROBLEM — M17.12 OSTEOARTHRITIS OF LEFT KNEE: Status: ACTIVE | Noted: 2018-05-21

## 2022-08-05 PROBLEM — I25.10 ATHEROSCLEROSIS OF CORONARY ARTERY: Status: ACTIVE | Noted: 2022-08-05

## 2022-08-05 PROBLEM — G89.29 CHRONIC BILATERAL LOW BACK PAIN WITH RIGHT-SIDED SCIATICA: Status: ACTIVE | Noted: 2019-06-28

## 2022-08-05 PROBLEM — R94.31 ABNORMAL ELECTROCARDIOGRAPHY: Status: ACTIVE | Noted: 2022-08-05

## 2022-08-05 PROBLEM — M54.41 CHRONIC BILATERAL LOW BACK PAIN WITH RIGHT-SIDED SCIATICA: Status: ACTIVE | Noted: 2019-06-28

## 2022-08-05 PROBLEM — M85.80 OSTEOPENIA: Status: ACTIVE | Noted: 2019-02-08

## 2022-08-05 PROBLEM — N95.2 VAGINAL ATROPHY: Status: ACTIVE | Noted: 2021-07-01

## 2022-08-05 PROBLEM — G43.109 MIGRAINE WITH AURA: Status: ACTIVE | Noted: 2022-08-05

## 2022-08-16 ENCOUNTER — OFFICE VISIT (OUTPATIENT)
Dept: NEUROLOGY | Age: 72
End: 2022-08-16
Payer: MEDICARE

## 2022-08-16 VITALS
DIASTOLIC BLOOD PRESSURE: 70 MMHG | BODY MASS INDEX: 32.61 KG/M2 | WEIGHT: 191 LBS | HEART RATE: 80 BPM | SYSTOLIC BLOOD PRESSURE: 110 MMHG | HEIGHT: 64 IN

## 2022-08-16 DIAGNOSIS — G43.109 OCULAR MIGRAINE: ICD-10-CM

## 2022-08-16 DIAGNOSIS — I63.89 OTHER CEREBRAL INFARCTION (HCC): ICD-10-CM

## 2022-08-16 DIAGNOSIS — R47.01 APHASIA: ICD-10-CM

## 2022-08-16 DIAGNOSIS — G45.9 TIA (TRANSIENT ISCHEMIC ATTACK): Primary | ICD-10-CM

## 2022-08-16 DIAGNOSIS — R90.89 ABNORMAL CT OF BRAIN: ICD-10-CM

## 2022-08-16 PROCEDURE — 1123F ACP DISCUSS/DSCN MKR DOCD: CPT | Performed by: PSYCHIATRY & NEUROLOGY

## 2022-08-16 PROCEDURE — 99204 OFFICE O/P NEW MOD 45 MIN: CPT | Performed by: PSYCHIATRY & NEUROLOGY

## 2022-08-16 RX ORDER — DIPHENHYDRAMINE HYDROCHLORIDE 25 MG/1
TABLET ORAL
COMMUNITY

## 2022-08-16 RX ORDER — ATORVASTATIN CALCIUM 40 MG/1
TABLET, FILM COATED ORAL
COMMUNITY
Start: 2022-07-04

## 2022-08-16 RX ORDER — ASPIRIN 81 MG/1
TABLET ORAL
COMMUNITY

## 2022-08-16 RX ORDER — CLOPIDOGREL BISULFATE 75 MG/1
75 TABLET ORAL DAILY
Qty: 30 TABLET | Refills: 3 | Status: SHIPPED | OUTPATIENT
Start: 2022-08-16

## 2022-08-16 ASSESSMENT — ENCOUNTER SYMPTOMS
TROUBLE SWALLOWING: 0
CHOKING: 0
PHOTOPHOBIA: 0
NAUSEA: 0
VOMITING: 0
BACK PAIN: 0
COLOR CHANGE: 0
SHORTNESS OF BREATH: 0

## 2022-08-16 NOTE — PROGRESS NOTES
Subjective:      Patient ID: [place] is a 66 y.o. female who presents today for:  Chief Complaint   Patient presents with    New Patient     Migraines , been having them since the 80's just has gotten worse with aura , patient did say she had some issues with getting words out or remembering what certain things are cant get the words out also. HPI 66 right-handed female who was referred here for migraine headaches. Patient also has chronic low back pain. Patient is referred as she had an episode of some word finding difficulty on 1 occasion  Patient reports that on the day of her symptoms that she had vision closing up and had a mild headache. She then walked to the neighbors and it was [employer] Day and she had decorations. She was about to talk to her about her decorations but could not recall the word mailbox or decorations. The whole episode lasted about 2 minutes. 6 months ago she had a similar episode while driving and she felt that her right thigh was pulling so she stopped the car and looked and she had a hematoma in the eye this was spontaneous. This    She has occasional migraine headaches but not too big picture of migrainous headaches is reported. She has some risk factors for some vascular disease. She has a family history of MTHFR gene. She had spells while she was on aspirin. Was seen by her cardiologist Dr. Indira Salas and had a carotid ultrasound results of those are not available but reported to be normal to her. She had a CT scan which shows a small lacunar infarct on the left which is new from previous studies though not acute.   Past Medical History:   Diagnosis Date    Cancer [facility]) 2008    Bladder    Encephalitis 1992    Hyperlipidemia     Osteoporosis     Shingles 1992     Past Surgical History:   Procedure Laterality Date    APPENDECTOMY  1982    COLONOSCOPY  May 14, 2012    screening by Dr Tessie Patel    COLONOSCOPY N/A 3/15/2022    COLONOSCOPY DIAGNOSTIC performed by Myriam Jean MD at [facility]  2010, cataract, R,  LASIK on both eye,     HARDWARE REMOVAL      HYSTERECTOMY (CERVIX STATUS UNKNOWN)      Total    [place]    left knee    LASIK  2007    both eyes    LUMBAR FUSION      SPINE SURGERY  89, 92    Lumbar, [facility], [facility]    PERCY AND BSO (CERVIX REMOVED)      fibroids, nonmalignant. TUBAL LIGATION       Social History     Socioeconomic History    Marital status:      Spouse name: Not on file    Number of children: Not on file    Years of education: Not on file    Highest education level: Not on file   Occupational History    Not on file   Tobacco Use    Smoking status: Former     Packs/day: 1.50     Years: 15.00     Pack years: 22.50     Types: Cigarettes     Quit date: 1983     Years since quittin.6    Smokeless tobacco: Never   Vaping Use    Vaping Use: Never used   Substance and Sexual Activity    Alcohol use: Yes     Comment: special occassions    Drug use: No    Sexual activity: Never   Other Topics Concern    Not on file   Social History Narrative    Not on file     Social Determinants of Health     Financial Resource Strain: Not on file   Food Insecurity: Not on file   Transportation Needs: Not on file   Physical Activity: Not on file   Stress: Not on file   Social Connections: Not on file   Intimate Partner Violence: Not on file   Housing Stability: Not on file     Family History   Problem Relation Age of Onset    Colon Cancer Neg Hx      Allergies   Allergen Reactions    Adhesive Tape Rash       Current Outpatient Medications   Medication Sig Dispense Refill    aspirin 81 MG EC tablet Take by mouth      Biotin 5 MG CAPS Take by mouth      CALCIUM PO Take by mouth      clopidogrel (PLAVIX) 75 MG tablet Take 1 tablet by mouth in the morning.  30 tablet 3    coenzyme Q10 100 MG CAPS capsule Take 100 mg by mouth 2 times daily      ezetimibe (ZETIA) 10 MG tablet Take 10 mg by mouth daily      cycloSPORINE, PF, (CEQUA) 0.09 % SOLN Apply 2 drops to eye 2 drops each eye daily      atorvastatin (LIPITOR) 40 MG tablet       estradiol (ESTRACE) 0.1 MG/GM vaginal cream Place 1 g vaginally Twice a Week (Patient not taking: Reported on 8/16/2022)      atorvastatin (LIPITOR) 20 MG tablet Take 20 mg by mouth daily. No current facility-administered medications for this visit. Review of Systems   Constitutional:  Negative for fever. HENT:  Negative for ear pain, tinnitus and trouble swallowing. Eyes:  Negative for photophobia and visual disturbance. Respiratory:  Negative for choking and shortness of breath. Cardiovascular:  Negative for chest pain and palpitations. Gastrointestinal:  Negative for nausea and vomiting. Musculoskeletal:  Negative for back pain, gait problem, joint swelling, myalgias, neck pain and neck stiffness. Skin:  Negative for color change. Allergic/Immunologic: Negative for food allergies. Neurological:  Negative for dizziness, tremors, seizures, syncope, facial asymmetry, speech difficulty, weakness, light-headedness, numbness and headaches. Psychiatric/Behavioral:  Negative for behavioral problems, confusion, hallucinations and sleep disturbance. Objective:   /70 (Site: Left Upper Arm, Position: Sitting, Cuff Size: Medium Adult)   Pulse 80   Ht 5' 4\" (1.626 m)   Wt 191 lb (86.6 kg)   BMI 32.79 kg/m²     Physical Exam  Vitals reviewed. Eyes:      Pupils: Pupils are equal, round, and reactive to light. Cardiovascular:      Rate and Rhythm: Normal rate and regular rhythm. Heart sounds: No murmur heard. Pulmonary:      Effort: Pulmonary effort is normal.      Breath sounds: Normal breath sounds. Abdominal:      General: Bowel sounds are normal.   Musculoskeletal:         General: Normal range of motion. Cervical back: Normal range of motion. Skin:     General: Skin is warm.    Neurological:      Mental Status: She is alert and oriented to person, place, and time. Cranial Nerves: No cranial nerve deficit. Sensory: No sensory deficit. Motor: No abnormal muscle tone. Coordination: Coordination normal.      Deep Tendon Reflexes: Reflexes are normal and symmetric. Babinski sign absent on the right side. Babinski sign absent on the left side. Psychiatric:         Mood and Affect: Mood normal.       CT HEAD WO CONTRAST    Result Date: 7/15/2022  HISTORY: TIA COMPARISON: February 19, 2011. TECHNIQUE:  Spiral CT examination of the brain was performed without intravenous contrast.  Images were obtained from the base of the skull up to the convexities in axial slices, and then examined in the blood, brain parenchymal and bony windows. No acute changes are noted. There is no evidence for mass, mass effect or midline shift. There is hypodensity seen in the left basal ganglia that measures 9 mm. .  Age-appropriate cortical volume loss is seen. There are mild patchy areas of hypoattenuation in a periventricular distribution consistent with nonspecific ischemic small vessel disease. There are no acute parenchymal alterations, blood byproducts or abnormal extracerebral fluid. The calvarium is grossly intact. The visualized paranasal sinuses are clear. 1.There is an area of lacunar infarct in the left basal ganglia. . This is new when compared to prior study from 2011. It, however: appears more chronic than acute. No acute hemorrhage is seen. 2. Mild periventricular white matter changes are seen thought to represent microvascular ischemic changes also. All CT scans at this facility use dose modulation, iterative reconstruction, and/or weight based dosing when appropriate to reduce radiation dose to as low as reasonably achievable.        Lab Results   Component Value Date/Time    WBC 3.8 05/02/2014 10:17 AM    RBC 4.55 05/02/2014 10:17 AM    RBC 4.06 04/20/2012 01:29 PM    HGB 14.6 05/02/2014 10:17 AM    HCT 41.5 05/02/2014 10:17 AM    MCV 91.2 05/02/2014 10:17 AM    MCH 32.0 05/02/2014 10:17 AM    MCHC 35.1 05/02/2014 10:17 AM    RDW 13.1 05/02/2014 10:17 AM     05/02/2014 10:17 AM    MPV 9.1 05/02/2014 10:17 AM     Lab Results   Component Value Date/Time     04/25/2018 08:49 AM    K 4.3 04/25/2018 08:49 AM     04/25/2018 08:49 AM    CO2 25 05/26/2015 07:31 AM    BUN 14 05/26/2015 07:31 AM    CREATININE 0.79 07/25/2019 03:22 PM    GFRAA >60 07/25/2019 03:22 PM    AGRATIO 1.7 04/25/2018 08:49 AM    LABGLOM >60 07/25/2019 03:22 PM    GLUCOSE 103 04/25/2018 08:49 AM    PROT 7.2 04/25/2018 08:49 AM    LABALBU 4.5 04/25/2018 08:49 AM    CALCIUM 9.8 04/25/2018 08:49 AM    BILITOT 1.0 04/25/2018 08:49 AM    ALKPHOS 75 04/25/2018 08:49 AM    AST 23 07/10/2019 09:15 AM    ALT 26 07/10/2019 09:15 AM     No results found for: PROTIME, INR  Lab Results   Component Value Date/Time    TSH 2.969 12/05/2011 07:21 AM    QNETMWNY77 703 10/10/2012 09:33 AM    FOLATE >24.0 10/10/2012 09:33 AM    FERRITIN 36 12/05/2011 07:21 AM    IRON 101 12/05/2011 07:21 AM    TIBC 330 12/05/2011 07:21 AM     Lab Results   Component Value Date/Time    TRIG 111 07/10/2019 09:15 AM    HDL 62.0 07/10/2019 09:15 AM    LDLCALC 83 04/25/2018 08:49 AM    LABVLDL 22 07/10/2019 09:15 AM     No results found for: Moises Mo, CANNAB, COCAINESCRN, LABMETH, OPIATESCREENURINE, PHENCYCLIDINESCREENURINE, PPXUR, ETOH  No results found for: LITHIUM, DILFRTOT, VALPROATE    Assessment:       Diagnosis Orders   1. TIA (transient ischemic attack)  MRI BRAIN WO CONTRAST    Mthfr Mutation    Homocysteine    Vitamin B12 & Folate      2. Other cerebral infarction ([address]   Homocysteine    Vitamin B12 & Folate      3. Ocular migraine        4. Aphasia        5. Abnormal CT of brain        Transient ischemic event or a complicated migraine which cannot be isolated.   Patient has risk factors for some vascular disease and had a spell while she was on aspirin and therefore recommended that we maximize her antiplatelet agent and change her to Plavix instead. In the interim I recommended an MRI of the brain to see what her accumulation of small vessel ischemic disease is. Also recommended that we obtain an MRA intracranial circulation  Rule out any large vessel disease. Patient also has a family history of MTHFR gene and we will pursue this further as well. Depending on the results of the same and homocystine levels will further advise. CT scan of the head was reviewed personally. Patient is on Zetia as well as lipid. She does not have diabetes. She is a non-smoker    Andrew Grider MD, CARLOS  Diplomate, American Board of Psychiatry & Neurology  Board Certified in Vascular Neurology  Board Certified in Neuromuscular Medicine  Certified in [address] [place] [address] Ave:      Orders Placed This Encounter   Procedures    MRI BRAIN WO CONTRAST     Standing Status:   Future     Standing Expiration Date:   8/16/2023     Order Specific Question:   Reason for exam:     Answer:   stroke    Mthfr Mutation     Standing Status:   Future     Standing Expiration Date:   8/16/2023     Order Specific Question:   MTHFR PCR Specimen     Answer:   blood    Homocysteine     Standing Status:   Future     Standing Expiration Date:   8/16/2023    Vitamin B12 & Folate     Standing Status:   Future     Standing Expiration Date:   8/16/2023     Orders Placed This Encounter   Medications    clopidogrel (PLAVIX) 75 MG tablet     Sig: Take 1 tablet by mouth in the morning. Dispense:  30 tablet     Refill:  3       No follow-ups on file.       Kellen Hunter MD

## 2022-08-19 DIAGNOSIS — G45.9 TIA (TRANSIENT ISCHEMIC ATTACK): ICD-10-CM

## 2022-08-19 DIAGNOSIS — R47.01 APHASIA: ICD-10-CM

## 2022-08-19 DIAGNOSIS — I63.89 OTHER CEREBRAL INFARCTION (HCC): ICD-10-CM

## 2022-08-19 LAB
ALBUMIN SERPL-MCNC: 4.6 G/DL (ref 3.5–4.6)
ALP BLD-CCNC: 72 U/L (ref 40–130)
ALT SERPL-CCNC: 22 U/L (ref 0–33)
ANION GAP SERPL CALCULATED.3IONS-SCNC: 11 MEQ/L (ref 9–15)
AST SERPL-CCNC: 20 U/L (ref 0–35)
BILIRUB SERPL-MCNC: 0.8 MG/DL (ref 0.2–0.7)
BUN BLDV-MCNC: 12 MG/DL (ref 8–23)
CALCIUM SERPL-MCNC: 9.7 MG/DL (ref 8.5–9.9)
CHLORIDE BLD-SCNC: 107 MEQ/L (ref 95–107)
CHOLESTEROL, TOTAL: 130 MG/DL (ref 0–199)
CO2: 22 MEQ/L (ref 20–31)
CREAT SERPL-MCNC: 0.7 MG/DL (ref 0.5–0.9)
GFR AFRICAN AMERICAN: >60
GFR NON-AFRICAN AMERICAN: >60
GLOBULIN: 2.4 G/DL (ref 2.3–3.5)
GLUCOSE BLD-MCNC: 114 MG/DL (ref 70–99)
HDLC SERPL-MCNC: 70 MG/DL (ref 40–59)
LDL CHOLESTEROL CALCULATED: 46 MG/DL (ref 0–129)
POTASSIUM SERPL-SCNC: 3.9 MEQ/L (ref 3.4–4.9)
SODIUM BLD-SCNC: 140 MEQ/L (ref 135–144)
TOTAL PROTEIN: 7 G/DL (ref 6.3–8)
TRIGL SERPL-MCNC: 72 MG/DL (ref 0–150)

## 2022-08-20 LAB
FOLATE: >20 NG/ML
HOMOCYSTEINE: 8.9 UMOL/L
VITAMIN B-12: 558 PG/ML (ref 232–1245)

## 2022-08-23 ENCOUNTER — TELEPHONE (OUTPATIENT)
Dept: NEUROLOGY | Age: 72
End: 2022-08-23

## 2022-08-23 ENCOUNTER — HOSPITAL ENCOUNTER (OUTPATIENT)
Dept: MRI IMAGING | Age: 72
Discharge: HOME | End: 2022-08-25

## 2022-08-23 DIAGNOSIS — E78.00 HYPERCHOLESTEROLEMIA: Primary | ICD-10-CM

## 2022-08-23 DIAGNOSIS — G45.9 TIA (TRANSIENT ISCHEMIC ATTACK): Primary | ICD-10-CM

## 2022-08-23 DIAGNOSIS — G45.9 TIA (TRANSIENT ISCHEMIC ATTACK): ICD-10-CM

## 2022-08-23 DIAGNOSIS — R47.01 APHASIA: ICD-10-CM

## 2022-08-23 NOTE — TELEPHONE ENCOUNTER
Patient called, states that she tried to have MRI/MRA Head this morning and was not able to complete due to being severely claustrophobic. She states that they advised for her to have it ordered with sedation, if you can please sign pended orders. Thank you!

## 2022-08-25 LAB
MTHFR BY PCR SPECIMEN: NORMAL
MTHFR INTERPRETATION: NORMAL
MTHFR MUTATION A1298C: NORMAL
MTHFR MUTATION C677T: NORMAL

## 2022-09-16 ENCOUNTER — HOSPITAL ENCOUNTER (OUTPATIENT)
Dept: MRI IMAGING | Age: 72
Discharge: HOME | End: 2022-09-04

## 2022-09-16 ENCOUNTER — ANESTHESIA (OUTPATIENT)
Dept: MRI IMAGING | Age: 72
End: 2022-09-16

## 2022-09-16 ENCOUNTER — ANESTHESIA EVENT (OUTPATIENT)
Dept: MRI IMAGING | Age: 72
End: 2022-09-16

## 2022-09-16 ENCOUNTER — HOSPITAL ENCOUNTER (OUTPATIENT)
Dept: MRI IMAGING | Age: 72
Discharge: HOME | End: 2022-09-18
Payer: MEDICARE

## 2022-09-16 VITALS
RESPIRATION RATE: 16 BRPM | HEART RATE: 80 BPM | DIASTOLIC BLOOD PRESSURE: 70 MMHG | TEMPERATURE: 98 F | SYSTOLIC BLOOD PRESSURE: 147 MMHG | OXYGEN SATURATION: 96 %

## 2022-09-16 DIAGNOSIS — R47.01 APHASIA: ICD-10-CM

## 2022-09-16 DIAGNOSIS — G45.9 TIA (TRANSIENT ISCHEMIC ATTACK): ICD-10-CM

## 2022-09-16 PROCEDURE — 3700000000 HC ANESTHESIA ATTENDED CARE

## 2022-09-16 PROCEDURE — 7100000011 HC PHASE II RECOVERY - ADDTL 15 MIN

## 2022-09-16 PROCEDURE — 2500000003 HC RX 250 WO HCPCS: Performed by: NURSE ANESTHETIST, CERTIFIED REGISTERED

## 2022-09-16 PROCEDURE — 70544 MR ANGIOGRAPHY HEAD W/O DYE: CPT

## 2022-09-16 PROCEDURE — 7100000010 HC PHASE II RECOVERY - FIRST 15 MIN

## 2022-09-16 PROCEDURE — 70551 MRI BRAIN STEM W/O DYE: CPT

## 2022-09-16 PROCEDURE — 2580000003 HC RX 258: Performed by: NURSE ANESTHETIST, CERTIFIED REGISTERED

## 2022-09-16 PROCEDURE — 3700000001 HC ADD 15 MINUTES (ANESTHESIA)

## 2022-09-16 RX ORDER — PROPOFOL 10 MG/ML
INJECTION, EMULSION INTRAVENOUS CONTINUOUS PRN
Status: DISCONTINUED | OUTPATIENT
Start: 2022-09-16 | End: 2022-09-16 | Stop reason: SDUPTHER

## 2022-09-16 RX ORDER — GLYCOPYRROLATE 1 MG/5 ML
SYRINGE (ML) INTRAVENOUS PRN
Status: DISCONTINUED | OUTPATIENT
Start: 2022-09-16 | End: 2022-09-16 | Stop reason: SDUPTHER

## 2022-09-16 RX ORDER — SODIUM CHLORIDE, SODIUM LACTATE, POTASSIUM CHLORIDE, CALCIUM CHLORIDE 600; 310; 30; 20 MG/100ML; MG/100ML; MG/100ML; MG/100ML
INJECTION, SOLUTION INTRAVENOUS CONTINUOUS PRN
Status: DISCONTINUED | OUTPATIENT
Start: 2022-09-16 | End: 2022-09-16 | Stop reason: SDUPTHER

## 2022-09-16 RX ORDER — LIDOCAINE HYDROCHLORIDE 20 MG/ML
INJECTION, SOLUTION INFILTRATION; PERINEURAL PRN
Status: DISCONTINUED | OUTPATIENT
Start: 2022-09-16 | End: 2022-09-16 | Stop reason: SDUPTHER

## 2022-09-16 RX ADMIN — LIDOCAINE HYDROCHLORIDE 40 MG: 20 INJECTION, SOLUTION INFILTRATION; PERINEURAL at 14:06

## 2022-09-16 RX ADMIN — SODIUM CHLORIDE, POTASSIUM CHLORIDE, SODIUM LACTATE AND CALCIUM CHLORIDE: 600; 310; 30; 20 INJECTION, SOLUTION INTRAVENOUS at 13:24

## 2022-09-16 RX ADMIN — PROPOFOL 120 MCG/KG/MIN: 10 INJECTION, EMULSION INTRAVENOUS at 14:06

## 2022-09-16 RX ADMIN — Medication 0.2 MG: at 14:06

## 2022-09-16 NOTE — ANESTHESIA POSTPROCEDURE EVALUATION
Department of Anesthesiology  Postprocedure Note    Patient: Glenn Dumont  MRN: [id number]  YOB: 1950  Date of evaluation: 9/16/2022      Procedure Summary     Date: 09/16/22 Room / Location: [facility]    Anesthesia Start: 6616 Anesthesia Stop:     Procedures:       MRI BRAIN WO CONTRAST      MRA HEAD WO CONTRAST Diagnosis:       TIA (transient ischemic attack)      Aphasia      Transient cerebral ischemic attack, unspecified      Aphasia      (stroke)      (TIA)    Scheduled Providers:  Responsible Provider: Hans Cabot, MD    Anesthesia Type: MAC ASA Status: 3          Anesthesia Type: No value filed.     Ileana Phase I:      Ileana Phase II: Ileana Score: 9      Anesthesia Post Evaluation    Patient location during evaluation: PACU  Patient participation: complete - patient participated  Level of consciousness: awake and alert  Pain score: 1  Airway patency: patent  Nausea & Vomiting: no nausea and no vomiting  Complications: no  Cardiovascular status: hemodynamically stable  Respiratory status: acceptable and room air  Hydration status: euvolemic  Comments: Report to RN, normal sinus rhythm

## 2022-09-16 NOTE — ANESTHESIA PRE PROCEDURE
Department of Anesthesiology  Preprocedure Note       Name:  Benjamin Bustamante   Age:  74 y.o.  :  1950                                          MRN:  [id number]         Date:  2022      Surgeon: * No surgeons listed *    Procedure: * No procedures listed *    Medications prior to admission:   Prior to Admission medications    Medication Sig Start Date End Date Taking? Authorizing Provider   aspirin 81 MG EC tablet Take by mouth    Historical Provider, MD   atorvastatin (LIPITOR) 40 MG tablet  22   Historical Provider, MD   Biotin 5 MG CAPS Take by mouth    Historical Provider, MD   CALCIUM PO Take by mouth    Historical Provider, MD   clopidogrel (PLAVIX) 75 MG tablet Take 1 tablet by mouth in the morning. 22   Kay Sheffield MD   estradiol (ESTRACE) 0.1 MG/GM vaginal cream Place 1 g vaginally Twice a Week  Patient not taking: Reported on 2022   Historical Provider, MD   coenzyme Q10 100 MG CAPS capsule Take 100 mg by mouth 2 times daily    Historical Provider, MD   ezetimibe (ZETIA) 10 MG tablet Take 10 mg by mouth daily    Historical Provider, MD   cycloSPORINE PF, (CEQUA) 0.09 % SOLN Apply 2 drops to eye 2 drops each eye daily    Historical Provider, MD   atorvastatin (LIPITOR) 20 MG tablet Take 20 mg by mouth daily. Historical Provider, MD       Current medications:    Current Outpatient Medications   Medication Sig Dispense Refill    aspirin 81 MG EC tablet Take by mouth      atorvastatin (LIPITOR) 40 MG tablet       Biotin 5 MG CAPS Take by mouth      CALCIUM PO Take by mouth      clopidogrel (PLAVIX) 75 MG tablet Take 1 tablet by mouth in the morning.  30 tablet 3    estradiol (ESTRACE) 0.1 MG/GM vaginal cream Place 1 g vaginally Twice a Week (Patient not taking: Reported on 2022)      coenzyme Q10 100 MG CAPS capsule Take 100 mg by mouth 2 times daily      ezetimibe (ZETIA) 10 MG tablet Take 10 mg by mouth daily      cycloSPORINE, PF, (CEQUA) 0.09 % SOLN Apply 2 drops to eye 2 drops each eye daily      atorvastatin (LIPITOR) 20 MG tablet Take 20 mg by mouth daily. No current facility-administered medications for this encounter. Allergies: Allergies   Allergen Reactions    Adhesive Tape Rash       Problem List:    Patient Active Problem List   Diagnosis Code    Family history of premature coronary heart disease Z82.49    Leukopenia D72.819    Hypercholesterolemia E78.00    Bladder cancer ([facility] C67.9    Cellulitis and abscess of right leg L03.115, L02.415    Puncture wound w/o foreign body, right lower leg, init S81.831A    [facility] L97.912    Rectal bleeding K62.5    Abnormal electrocardiography R94.31    Atherosclerosis of coronary artery I25.10    Chronic bilateral low back pain with right-sided sciatica M54.41, G89.29    Cystocele, midline N81.11    Fatigue R53.83    Gait difficulty R26.9    Ocular migraine G43. 109    Obesity E66.9    Osteopenia M85.80    Osteoarthritis of left knee M17.12    TIA (transient ischemic attack) G45.9    Vaginal atrophy N95.2    Other cerebral infarction ([facility]  I63.89    Aphasia R47.01    Abnormal CT of brain R90.89       Past Medical History:        Diagnosis Date    Cancer [facility]) 2008    Bladder    Encephalitis 1992    Hyperlipidemia     Osteoporosis     Shingles 1992       Past Surgical History:        Procedure Laterality Date    APPENDECTOMY  1982    COLONOSCOPY  May 14, 2012    screening by Dr Therese Mejia 3/15/2022    COLONOSCOPY DIAGNOSTIC performed by Lauri Briscoe MD at [address]  2010 2010, cataract, R,  LASIK on both eye, 2007    HARDWARE REMOVAL      HYSTERECTOMY (CERVIX STATUS UNKNOWN)  1982    Total   [facility]    left knee    LASIK  2007    both eyes    LUMBAR FUSION      SPINE SURGERY  89, 92    Lumbar, [facility], [facility]    PERCY AND BSO (CERVIX REMOVED)  1983 PM    BILITOT 0.8 08/19/2022 01:47 PM    ALKPHOS 72 08/19/2022 01:47 PM    AST 20 08/19/2022 01:47 PM    ALT 22 08/19/2022 01:47 PM       POC Tests: No results for input(s): POCGLU, POCNA, POCK, POCCL, POCBUN, POCHEMO, POCHCT in the last 72 hours. Coags: No results found for: PROTIME, INR, APTT    HCG (If Applicable): No results found for: PREGTESTUR, PREGSERUM, HCG, HCGQUANT     ABGs: No results found for: PHART, PO2ART, ZNI2QVQ, VFY1GSN, BEART, E6ENTDPX     Type & Screen (If Applicable):  No results found for: LABABO, LABRH    Drug/Infectious Status (If Applicable):  No results found for: HIV, HEPCAB    COVID-19 Screening (If Applicable): No results found for: COVID19        Anesthesia Evaluation  Patient summary reviewed and Nursing notes reviewed no history of anesthetic complications:   Airway: Mallampati: II  TM distance: >3 FB   Neck ROM: full  Mouth opening: > = 3 FB   Dental: normal exam         Pulmonary:Negative Pulmonary ROS and normal exam                               Cardiovascular:  Exercise tolerance: good (>4 METS),   (+) CAD:, hyperlipidemia      ECG reviewed               Beta Blocker:  Not on Beta Blocker         Neuro/Psych:   Negative Neuro/Psych ROS  (+) TIA, headaches:,             GI/Hepatic/Renal: Neg GI/Hepatic/Renal ROS            Endo/Other: Negative Endo/Other ROS   (+) malignancy/cancer. Pt had PAT visit. ROS comment: Hx of bladder CA Abdominal:             Vascular: negative vascular ROS. Other Findings:           Anesthesia Plan      MAC     ASA 3       Induction: intravenous. MIPS: Postoperative opioids intended and Prophylactic antiemetics administered. Anesthetic plan and risks discussed with patient. Plan discussed with CRNA.     Attending anesthesiologist reviewed and agrees with Pre Eval content                James Barba MD   9/16/2022

## 2022-09-27 ENCOUNTER — TELEPHONE (OUTPATIENT)
Dept: NEUROLOGY | Age: 72
End: 2022-09-27

## 2022-12-07 PROBLEM — G45.9 TRANSIENT CEREBRAL ISCHEMIC ATTACK, UNSPECIFIED: Status: ACTIVE | Noted: 2022-07-11

## 2022-12-13 ENCOUNTER — OFFICE VISIT (OUTPATIENT)
Dept: NEUROLOGY | Age: 72
End: 2022-12-13
Payer: MEDICARE

## 2022-12-13 VITALS
BODY MASS INDEX: 33.81 KG/M2 | DIASTOLIC BLOOD PRESSURE: 78 MMHG | SYSTOLIC BLOOD PRESSURE: 136 MMHG | WEIGHT: 197 LBS | HEART RATE: 74 BPM

## 2022-12-13 DIAGNOSIS — G43.109 OCULAR MIGRAINE: ICD-10-CM

## 2022-12-13 DIAGNOSIS — I67.1 CEREBRAL ANEURYSM: ICD-10-CM

## 2022-12-13 DIAGNOSIS — G44.209 MUSCLE TENSION HEADACHE: ICD-10-CM

## 2022-12-13 DIAGNOSIS — R90.89 ABNORMAL CT OF BRAIN: ICD-10-CM

## 2022-12-13 DIAGNOSIS — I63.89 OTHER CEREBRAL INFARCTION (HCC): Primary | ICD-10-CM

## 2022-12-13 PROCEDURE — 99214 OFFICE O/P EST MOD 30 MIN: CPT | Performed by: PSYCHIATRY & NEUROLOGY

## 2022-12-13 PROCEDURE — 1123F ACP DISCUSS/DSCN MKR DOCD: CPT | Performed by: PSYCHIATRY & NEUROLOGY

## 2022-12-13 RX ORDER — MAGNESIUM OXIDE 400 MG/1
400 TABLET ORAL DAILY
COMMUNITY

## 2022-12-13 RX ORDER — CLOBETASOL PROPIONATE 0.46 MG/ML
SOLUTION TOPICAL
COMMUNITY
Start: 2022-12-10

## 2022-12-13 RX ORDER — THIAMINE MONONITRATE (VIT B1) 100 MG
100 TABLET ORAL DAILY
COMMUNITY

## 2022-12-13 RX ORDER — LANOLIN ALCOHOL/MO/W.PET/CERES
CREAM (GRAM) TOPICAL
COMMUNITY
Start: 2008-07-16

## 2022-12-13 RX ORDER — CLOPIDOGREL BISULFATE 75 MG/1
TABLET ORAL
Qty: 30 TABLET | Refills: 0 | Status: SHIPPED | OUTPATIENT
Start: 2022-12-13

## 2022-12-13 ASSESSMENT — ENCOUNTER SYMPTOMS
PHOTOPHOBIA: 0
TROUBLE SWALLOWING: 0
CHOKING: 0
BACK PAIN: 0
COLOR CHANGE: 0
VOMITING: 0
SHORTNESS OF BREATH: 0
NAUSEA: 0

## 2022-12-13 NOTE — TELEPHONE ENCOUNTER
Pharmacy is requesting medication refill.  Please approve or deny this request.    Rx requested:  Requested Prescriptions     Pending Prescriptions Disp Refills    clopidogrel (PLAVIX) 75 MG tablet [Pharmacy Med Name: Clopidogrel Bisulfate Oral Tablet 75 MG] 30 tablet 0     Sig: TAKE ONE TABLET BY MOUTH IN THE MORNING         Last Office Visit:   8/16/2022      Next Visit Date:  Future Appointments   Date Time Provider James Becerril   12/13/2022  3:00 PM Andrew Odell MD [facility] [address] [facility] Neurology -

## 2022-12-13 NOTE — PROGRESS NOTES
Subjective:      Patient ID: Shilpa Machuca is a 74 y.o. female who presents today for:  Chief Complaint   Patient presents with    Follow-up     Pt states she is getting a really bad headache every day. Pt states she is getting sensitive to light. HPI 74 right-handed female with history of cerebral TIA with ocular migraines with aphasia. Patient has abnormal CT of the brain. Patient was seen for migraine headaches and chronic low back pain. CT scan revealed a hypodensity in the basal ganglia 9 mm the age of this was difficult to ascertain. This though was new since the last study in 2011. Was a microvascular change as expected. 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 Further evaluated this and referred her to Dr. Claudia Batista at [facility] and she had a virtual appointment. She had a discussion and at this time no intervention is recommended. We will follow this in a year. We did discuss to keep her blood pressure at the lower end and she is non-smoker and therefore it is very unlikely that this is going to burst.  If she had a significant headache she should come to the emergency room. Patient has chronic daily headaches and these are from her neck. She has good tightness of the neck muscles. She is tense person.   She is pain in the temples as well    Past Medical History:   Diagnosis Date    Cancer [facility]) 2008    Bladder    Encephalitis 1992    Hyperlipidemia     Osteoporosis     Shingles 1992     Past Surgical History:   Procedure Laterality Date    APPENDECTOMY  1982    COLONOSCOPY  May 14, 2012    screening by Dr Britany Amador    COLONOSCOPY N/A 3/15/2022    COLONOSCOPY DIAGNOSTIC performed by Caren Lopez MD at [address]  2010 2010, cataract, R,  LASIK on both eye, 2007    HARDWARE REMOVAL      HYSTERECTOMY ([facility])  1982 Total    KNEE SURGERY  1988    left knee    LASIK  2007    both eyes    LUMBAR FUSION      SPINE SURGERY  89, 92    Lumbar, [facility], [facility]    PERCY AND BSO (CERVIX REMOVED)      fibroids, nonmalignant.     TUBAL LIGATION       Social History     Socioeconomic History    Marital status:      Spouse name: Not on file    Number of children: Not on file    Years of education: Not on file    Highest education level: Not on file   Occupational History    Not on file   Tobacco Use    Smoking status: Former     Packs/day: 1.50     Years: 15.00     Pack years: 22.50     Types: Cigarettes     Quit date: 1983     Years since quittin.9    Smokeless tobacco: Never   Vaping Use    Vaping Use: Never used   Substance and Sexual Activity    Alcohol use: Yes     Comment: special occassions    Drug use: No    Sexual activity: Never   Other Topics Concern    Not on file   Social History Narrative    Not on file     Social Determinants of Health     Financial Resource Strain: Not on file   Food Insecurity: Not on file   Transportation Needs: Not on file   Physical Activity: Not on file   Stress: Not on file   Social Connections: Not on file   Intimate Partner Violence: Not on file   Housing Stability: Not on file     Family History   Problem Relation Age of Onset    Colon Cancer Neg Hx      Allergies   Allergen Reactions    Adhesive Tape Rash       Current Outpatient Medications   Medication Sig Dispense Refill    clopidogrel (PLAVIX) 75 MG tablet TAKE ONE TABLET BY MOUTH IN THE MORNING 30 tablet 0    calcium citrate-vitamin D (CITRACAL+D) 315-5 MG-MCG TABS per tablet Take by mouth      magnesium oxide (MAG-OX) 400 MG tablet Take 400 mg by mouth daily      vitamin B-1 (THIAMINE) 100 MG tablet Take 100 mg by mouth daily      atorvastatin (LIPITOR) 40 MG tablet       Biotin 5 MG CAPS Take by mouth      CALCIUM PO Take by mouth      coenzyme Q10 100 MG CAPS capsule Take 100 mg by mouth 2 times daily      ezetimibe (ZETIA) 10 MG tablet Take 10 mg by mouth daily      cycloSPORINE, PF, (CEQUA) 0.09 % SOLN Apply 2 drops to eye 2 drops each eye daily      clobetasol (TEMOVATE) 0.05 % external solution       aspirin 81 MG EC tablet Take by mouth      estradiol (ESTRACE) 0.1 MG/GM vaginal cream Place 1 g vaginally Twice a Week (Patient not taking: Reported on 8/16/2022)      atorvastatin (LIPITOR) 20 MG tablet Take 20 mg by mouth daily. No current facility-administered medications for this visit. Review of Systems   Constitutional:  Negative for fever. HENT:  Negative for ear pain, tinnitus and trouble swallowing. Eyes:  Negative for photophobia and visual disturbance. Respiratory:  Negative for choking and shortness of breath. Cardiovascular:  Negative for chest pain and palpitations. Gastrointestinal:  Negative for nausea and vomiting. Musculoskeletal:  Negative for back pain, gait problem, joint swelling, myalgias, neck pain and neck stiffness. Skin:  Negative for color change. Allergic/Immunologic: Negative for food allergies. Neurological:  Positive for headaches. Negative for dizziness, tremors, seizures, syncope, facial asymmetry, speech difficulty, weakness, light-headedness and numbness. Psychiatric/Behavioral:  Negative for behavioral problems, confusion, hallucinations and sleep disturbance. Objective:   /78 (Site: Right Upper Arm, Position: Sitting, Cuff Size: Medium Adult)   Pulse 74   Wt 197 lb (89.4 kg)   BMI 33.81 kg/m²     Physical Exam  Vitals reviewed. Eyes:      Pupils: Pupils are equal, round, and reactive to light. Cardiovascular:      Rate and Rhythm: Normal rate and regular rhythm. Heart sounds: No murmur heard. Pulmonary:      Effort: Pulmonary effort is normal.      Breath sounds: Normal breath sounds. Abdominal:      General: Bowel sounds are normal.   Musculoskeletal:         General: Normal range of motion.       Cervical back: Normal range of motion. Skin:     General: Skin is warm. Neurological:      Mental Status: She is alert and oriented to person, place, and time. Cranial Nerves: No cranial nerve deficit. Sensory: No sensory deficit. Motor: No abnormal muscle tone. Coordination: Coordination normal.      Deep Tendon Reflexes: Reflexes are normal and symmetric. Babinski sign absent on the right side. Babinski sign absent on the left side. Psychiatric:         Mood and Affect: Mood normal.       MRA HEAD WO CONTRAST    Result Date: 9/21/2022  EXAMINATION: MRI OF THE BRAIN WITHOUT CONTRAST AND MRA HEAD WITHOUT CONTRAST 9/16/2022 2:26 pm TECHNIQUE: Multiplanar multisequence MRI of the brain was performed without the administration of intravenous contrast. MRA of the head was performed utilizing time-of-flight imaging with MIP images. No intravenous contrast was administered. COMPARISON: None HISTORY: ORDERING SYSTEM PROVIDED HISTORY: Aphasia TECHNOLOGIST PROVIDED HISTORY: Reason for exam:->TIA What is the sedation requirement?->Sedation; ORDERING SYSTEM PROVIDED HISTORY: TIA (transient ischemic attack) TECHNOLOGIST PROVIDED HISTORY: Reason for exam:->stroke What is the sedation requirement?->Sedation FINDINGS: MRI BRAIN: INTRACRANIAL STRUCTURES/VENTRICLES: There are no areas of restricted diffusion identified to suggest an acute infarct. There is no acute intracranial hemorrhage. No mass effect or midline shift is present. There are multiple foci of abnormal increased T2/FLAIR signal intensity within the periventricular, subcortical and deep white matter of both hemispheres. There is no sellar or suprasellar mass present. There is no ventriculomegaly or abnormal extra-axial fluid collection present. There is no cerebellopontine angle mass. ORBITS: Limited evaluation of the orbits is unremarkable. SINUSES: The paranasal sinuses and mastoid air cells are clear.  BONES/SOFT TISSUES: Bone marrow signal intensity is normal. MRA HEAD: ANTERIOR CIRCULATION: There is a 3 x 4 mm aneurysm directed medially from the cavernous segment of the left internal carotid artery. The intracranial segments of the internal carotid arteries are otherwise normal.  There is no significant stenosis or aneurysm identified. The anterior and middle cerebral arteries are normal in appearance. No significant stenosis or aneurysm is identified. POSTERIOR CIRCULATION: The distal vertebral arteries are normal in appearance. The basilar artery is normal.  No significant stenosis or aneurysm is identified. The posterior cerebral arteries are normal in appearance. 1. No acute infarct or etiology identified to explain the patient's aphasia. 2. Moderate chronic small vessel ischemic changes. 3. Saccular 3 x 4 mm aneurysm directed medially from the cavernous segment of the left internal carotid artery. 4. Otherwise, unremarkable MR angiogram of the brain. The findings were sent to the Radiology Results [id number] at 11:17 am on 9/20/2022 to be communicated to a licensed caregiver. MRI BRAIN WO CONTRAST    Result Date: 9/21/2022  EXAMINATION: MRI OF THE BRAIN WITHOUT CONTRAST AND MRA HEAD WITHOUT CONTRAST 9/16/2022 2:26 pm TECHNIQUE: Multiplanar multisequence MRI of the brain was performed without the administration of intravenous contrast. MRA of the head was performed utilizing time-of-flight imaging with MIP images. No intravenous contrast was administered. COMPARISON: None HISTORY: ORDERING SYSTEM PROVIDED HISTORY: Aphasia TECHNOLOGIST PROVIDED HISTORY: Reason for exam:->TIA What is the sedation requirement?->Sedation; ORDERING SYSTEM PROVIDED HISTORY: TIA (transient ischemic attack) TECHNOLOGIST PROVIDED HISTORY: Reason for exam:->stroke What is the sedation requirement?->Sedation FINDINGS: MRI BRAIN: INTRACRANIAL STRUCTURES/VENTRICLES: There are no areas of restricted diffusion identified to suggest an acute infarct.   There is no acute intracranial hemorrhage. No mass effect or midline shift is present. There are multiple foci of abnormal increased T2/FLAIR signal intensity within the periventricular, subcortical and deep white matter of both hemispheres. There is no sellar or suprasellar mass present. There is no ventriculomegaly or abnormal extra-axial fluid collection present. There is no cerebellopontine angle mass. ORBITS: Limited evaluation of the orbits is unremarkable. SINUSES: The paranasal sinuses and mastoid air cells are clear. BONES/SOFT TISSUES: Bone marrow signal intensity is normal. MRA HEAD: ANTERIOR CIRCULATION: There is a 3 x 4 mm aneurysm directed medially from the cavernous segment of the left internal carotid artery. The intracranial segments of the internal carotid arteries are otherwise normal.  There is no significant stenosis or aneurysm identified. The anterior and middle cerebral arteries are normal in appearance. No significant stenosis or aneurysm is identified. POSTERIOR CIRCULATION: The distal vertebral arteries are normal in appearance. The basilar artery is normal.  No significant stenosis or aneurysm is identified. The posterior cerebral arteries are normal in appearance. 1. No acute infarct or etiology identified to explain the patient's aphasia. 2. Moderate chronic small vessel ischemic changes. 3. Saccular 3 x 4 mm aneurysm directed medially from the cavernous segment of the left internal carotid artery. 4. Otherwise, unremarkable MR angiogram of the brain. The findings were sent to the Radiology Results [id number] at 11:17 am on 9/20/2022 to be communicated to a licensed caregiver.        Lab Results   Component Value Date/Time    WBC 3.8 05/02/2014 10:17 AM    RBC 4.55 05/02/2014 10:17 AM    RBC 4.06 04/20/2012 01:29 PM    HGB 14.6 05/02/2014 10:17 AM    HCT 41.5 05/02/2014 10:17 AM    MCV 91.2 05/02/2014 10:17 AM    MCH 32.0 05/02/2014 10:17 AM    MCHC 35.1 05/02/2014 10:17 AM    RDW appointment and does not require intervention at this time. The studies have shown that cerebral aneurysm less than 7 mm is almost 0% chance of bleeding unless patient has high blood pressures all the time and is a smoker. We discussed this finding and she is somewhat more comfortable and we will follow this in a year. Her main issues appears to be aches which are chronic daily headaches and she has Sequent tenderness of the trapezius muscles bilaterally which affects her temples. Holistic approach with heat ice treatments and exercise programs and relaxation techniques is recommended. We will see her in 6 months and then follow-up with a CT angiogram in about 1 year. Andrew Juarez MD, [facility] Macias, American Board of Psychiatry & Neurology  Board Certified in Vascular Neurology  Board Certified in Neuromuscular Medicine  Certified in [facility]:      No orders of the defined types were placed in this encounter. No orders of the defined types were placed in this encounter. No follow-ups on file.       Lucy Garcia MD

## 2022-12-19 ENCOUNTER — TELEPHONE (OUTPATIENT)
Dept: NEUROLOGY | Age: 72
End: 2022-12-19

## 2022-12-19 DIAGNOSIS — M17.12 PRIMARY OSTEOARTHRITIS OF LEFT KNEE: Primary | ICD-10-CM

## 2022-12-29 NOTE — PLAN OF CARE
Problem: Pain:  Goal: Pain level will decrease  Description: Pain level will decrease  Outcome: Ongoing  Goal: Control of acute pain  Description: Control of acute pain  Outcome: Ongoing  Goal: Control of chronic pain  Description: Control of chronic pain  Outcome: Ongoing     Problem: Wound:  Goal: Will show signs of wound healing; wound closure and no evidence of infection  Description: Will show signs of wound healing; wound closure and no evidence of infection  Outcome: Ongoing Plastic Surgeon Procedure Text (A): After obtaining clear surgical margins the patient was sent to plastics for surgical repair.  The patient understands they will receive post-surgical care and follow-up from the referring physician's office.

## 2023-01-26 RX ORDER — CLOPIDOGREL BISULFATE 75 MG/1
TABLET ORAL
Qty: 30 TABLET | Refills: 0 | Status: SHIPPED | OUTPATIENT
Start: 2023-01-26

## 2023-01-26 NOTE — TELEPHONE ENCOUNTER
Pharmacy is requesting medication refill.  Please approve or deny this request.    Rx requested:  Requested Prescriptions     Pending Prescriptions Disp Refills    clopidogrel (PLAVIX) 75 MG tablet [Pharmacy Med Name: Clopidogrel Bisulfate Oral Tablet 75 MG] 30 tablet 0     Sig: TAKE ONE TABLET BY MOUTH EVERY MORNING         Last Office Visit:   12/13/2022      Next Visit Date:  Future Appointments   Date Time Provider James Becerril   6/13/2023  1:45 PM Andrew Arredondo MD [address] Neurology -

## 2023-03-06 RX ORDER — CLOPIDOGREL BISULFATE 75 MG/1
TABLET ORAL
Qty: 30 TABLET | Refills: 0 | Status: SHIPPED | OUTPATIENT
Start: 2023-03-06

## 2023-03-06 NOTE — TELEPHONE ENCOUNTER
Pharmacy is requesting medication refill.  Please approve or deny this request.    Rx requested:  Requested Prescriptions     Pending Prescriptions Disp Refills    clopidogrel (PLAVIX) 75 MG tablet [Pharmacy Med Name: Clopidogrel Bisulfate Oral Tablet 75 MG] 30 tablet 0     Sig: TAKE ONE TABLET BY MOUTH EVERY MORNING         Last Office Visit:   12/13/2022      Next Visit Date:  Future Appointments   Date Time Provider James Becerril   6/13/2023  1:45 PM Andrew Dumont MD [address] Neurology -

## 2023-03-24 RX ORDER — CLOPIDOGREL BISULFATE 75 MG/1
TABLET ORAL
Qty: 30 TABLET | Refills: 0 | Status: SHIPPED | OUTPATIENT
Start: 2023-03-24

## 2023-03-24 NOTE — TELEPHONE ENCOUNTER
Pharmacy is requesting medication refill.  Please approve or deny this request.    Rx requested:  Requested Prescriptions     Pending Prescriptions Disp Refills    clopidogrel (PLAVIX) 75 MG tablet [Pharmacy Med Name: Clopidogrel Bisulfate Oral Tablet 75 MG] 30 tablet 0     Sig: TAKE ONE TABLET BY MOUTH EVERY MORNING         Last Office Visit:   12/13/2022      Next Visit Date:  Future Appointments   Date Time Provider James Becerril   6/13/2023  1:45 PM Andrew Blackman MD [address] Neurology -

## 2023-05-02 RX ORDER — CLOPIDOGREL BISULFATE 75 MG/1
TABLET ORAL
Qty: 30 TABLET | Refills: 1 | Status: SHIPPED | OUTPATIENT
Start: 2023-05-02

## 2023-05-02 NOTE — TELEPHONE ENCOUNTER
Pharmacy is requesting medication refill.  Please approve or deny this request.    Rx requested:  Requested Prescriptions     Pending Prescriptions Disp Refills    clopidogrel (PLAVIX) 75 MG tablet [Pharmacy Med Name: Clopidogrel Bisulfate Oral Tablet 75 MG] 30 tablet 1     Sig: TAKE ONE TABLET BY MOUTH EVERY MORNING         Last Office Visit:   12/13/2022      Next Visit Date:  Future Appointments   Date Time Provider James Becerril   6/13/2023  1:45 PM Andrew Rush MD [address] Neurology -

## 2023-06-13 ENCOUNTER — OFFICE VISIT (OUTPATIENT)
Dept: NEUROLOGY | Age: 73
End: 2023-06-13
Payer: MEDICARE

## 2023-06-13 VITALS
DIASTOLIC BLOOD PRESSURE: 76 MMHG | BODY MASS INDEX: 36.36 KG/M2 | WEIGHT: 211.8 LBS | SYSTOLIC BLOOD PRESSURE: 124 MMHG | HEART RATE: 80 BPM

## 2023-06-13 DIAGNOSIS — R26.9 GAIT DIFFICULTY: ICD-10-CM

## 2023-06-13 DIAGNOSIS — I67.1 ANEURYSM, CEREBRAL, NONRUPTURED: Primary | ICD-10-CM

## 2023-06-13 DIAGNOSIS — G44.209 MUSCLE TENSION HEADACHE: ICD-10-CM

## 2023-06-13 DIAGNOSIS — I67.1 CEREBRAL ANEURYSM: ICD-10-CM

## 2023-06-13 DIAGNOSIS — G45.9 TIA (TRANSIENT ISCHEMIC ATTACK): ICD-10-CM

## 2023-06-13 DIAGNOSIS — M48.02 CERVICAL SPINAL STENOSIS: ICD-10-CM

## 2023-06-13 PROCEDURE — 1123F ACP DISCUSS/DSCN MKR DOCD: CPT | Performed by: PSYCHIATRY & NEUROLOGY

## 2023-06-13 PROCEDURE — 99214 OFFICE O/P EST MOD 30 MIN: CPT | Performed by: PSYCHIATRY & NEUROLOGY

## 2023-06-13 RX ORDER — FLUTICASONE PROPIONATE 50 MCG
SPRAY, SUSPENSION (ML) NASAL
COMMUNITY
Start: 2023-03-22

## 2023-06-13 RX ORDER — CHLORHEXIDINE GLUCONATE 0.12 MG/ML
RINSE ORAL
COMMUNITY
Start: 2023-04-04

## 2023-06-13 RX ORDER — CLOPIDOGREL BISULFATE 75 MG/1
75 TABLET ORAL EVERY MORNING
Qty: 30 TABLET | Refills: 1 | Status: SHIPPED | OUTPATIENT
Start: 2023-06-13

## 2023-06-13 ASSESSMENT — ENCOUNTER SYMPTOMS
COLOR CHANGE: 0
NAUSEA: 0
CHOKING: 0
TROUBLE SWALLOWING: 0
BACK PAIN: 0
VOMITING: 0
PHOTOPHOBIA: 0
SHORTNESS OF BREATH: 0

## 2023-07-10 ENCOUNTER — ANESTHESIA EVENT (OUTPATIENT)
Dept: MRI IMAGING | Age: 73
End: 2023-07-10

## 2023-07-10 ENCOUNTER — HOSPITAL ENCOUNTER (OUTPATIENT)
Dept: MRI IMAGING | Age: 73
Discharge: HOME | End: 2023-07-12
Attending: PSYCHIATRY & NEUROLOGY
Payer: MEDICARE

## 2023-07-10 ENCOUNTER — ANESTHESIA (OUTPATIENT)
Dept: MRI IMAGING | Age: 73
End: 2023-07-10

## 2023-07-10 VITALS
HEART RATE: 71 BPM | DIASTOLIC BLOOD PRESSURE: 88 MMHG | WEIGHT: 211 LBS | OXYGEN SATURATION: 96 % | HEIGHT: 64 IN | SYSTOLIC BLOOD PRESSURE: 181 MMHG | BODY MASS INDEX: 36.02 KG/M2 | TEMPERATURE: 97.5 F | RESPIRATION RATE: 18 BRPM

## 2023-07-10 VITALS
TEMPERATURE: 97.4 F | SYSTOLIC BLOOD PRESSURE: 160 MMHG | OXYGEN SATURATION: 96 % | RESPIRATION RATE: 16 BRPM | HEART RATE: 72 BPM | DIASTOLIC BLOOD PRESSURE: 85 MMHG

## 2023-07-10 DIAGNOSIS — I67.1 ANEURYSM, CEREBRAL, NONRUPTURED: ICD-10-CM

## 2023-07-10 DIAGNOSIS — M48.02 CERVICAL SPINAL STENOSIS: ICD-10-CM

## 2023-07-10 PROCEDURE — 70544 MR ANGIOGRAPHY HEAD W/O DYE: CPT

## 2023-07-10 PROCEDURE — 2580000003 HC RX 258: Performed by: ANESTHESIOLOGY

## 2023-07-10 PROCEDURE — 72141 MRI NECK SPINE W/O DYE: CPT

## 2023-07-10 RX ORDER — MIDAZOLAM HYDROCHLORIDE 1 MG/ML
INJECTION INTRAMUSCULAR; INTRAVENOUS PRN
Status: DISCONTINUED | OUTPATIENT
Start: 2023-07-10 | End: 2023-07-10 | Stop reason: SDUPTHER

## 2023-07-10 RX ORDER — MEPERIDINE HYDROCHLORIDE 25 MG/ML
12.5 INJECTION INTRAMUSCULAR; INTRAVENOUS; SUBCUTANEOUS
Status: ACTIVE | OUTPATIENT
Start: 2023-07-10 | End: 2023-07-11

## 2023-07-10 RX ORDER — FENTANYL CITRATE 0.05 MG/ML
50 INJECTION, SOLUTION INTRAMUSCULAR; INTRAVENOUS EVERY 10 MIN PRN
Status: DISCONTINUED | OUTPATIENT
Start: 2023-07-10 | End: 2023-07-13 | Stop reason: HOSPADM

## 2023-07-10 RX ORDER — SODIUM CHLORIDE 0.9 % (FLUSH) 0.9 %
5-40 SYRINGE (ML) INJECTION EVERY 12 HOURS SCHEDULED
Status: DISCONTINUED | OUTPATIENT
Start: 2023-07-10 | End: 2023-07-13 | Stop reason: HOSPADM

## 2023-07-10 RX ORDER — DIPHENHYDRAMINE HYDROCHLORIDE 50 MG/ML
12.5 INJECTION INTRAMUSCULAR; INTRAVENOUS
Status: ACTIVE | OUTPATIENT
Start: 2023-07-10 | End: 2023-07-11

## 2023-07-10 RX ORDER — METOCLOPRAMIDE HYDROCHLORIDE 5 MG/ML
10 INJECTION INTRAMUSCULAR; INTRAVENOUS
Status: ACTIVE | OUTPATIENT
Start: 2023-07-10 | End: 2023-07-11

## 2023-07-10 RX ORDER — SODIUM CHLORIDE 9 MG/ML
INJECTION, SOLUTION INTRAVENOUS PRN
Status: DISCONTINUED | OUTPATIENT
Start: 2023-07-10 | End: 2023-07-13 | Stop reason: HOSPADM

## 2023-07-10 RX ORDER — ONDANSETRON 2 MG/ML
4 INJECTION INTRAMUSCULAR; INTRAVENOUS
Status: ACTIVE | OUTPATIENT
Start: 2023-07-10 | End: 2023-07-11

## 2023-07-10 RX ORDER — HYDROMORPHONE HYDROCHLORIDE 1 MG/ML
0.5 INJECTION, SOLUTION INTRAMUSCULAR; INTRAVENOUS; SUBCUTANEOUS EVERY 10 MIN PRN
Status: DISCONTINUED | OUTPATIENT
Start: 2023-07-10 | End: 2023-07-13 | Stop reason: HOSPADM

## 2023-07-10 RX ORDER — OXYCODONE HYDROCHLORIDE 5 MG/1
5 TABLET ORAL
Status: ACTIVE | OUTPATIENT
Start: 2023-07-10 | End: 2023-07-11

## 2023-07-10 RX ORDER — SODIUM CHLORIDE, SODIUM LACTATE, POTASSIUM CHLORIDE, CALCIUM CHLORIDE 600; 310; 30; 20 MG/100ML; MG/100ML; MG/100ML; MG/100ML
INJECTION, SOLUTION INTRAVENOUS CONTINUOUS
Status: DISCONTINUED | OUTPATIENT
Start: 2023-07-10 | End: 2023-07-13 | Stop reason: HOSPADM

## 2023-07-10 RX ORDER — SODIUM CHLORIDE 0.9 % (FLUSH) 0.9 %
5-40 SYRINGE (ML) INJECTION PRN
Status: DISCONTINUED | OUTPATIENT
Start: 2023-07-10 | End: 2023-07-13 | Stop reason: HOSPADM

## 2023-07-10 RX ADMIN — MIDAZOLAM HYDROCHLORIDE 2 MG: 1 INJECTION INTRAMUSCULAR; INTRAVENOUS at 14:08

## 2023-07-10 RX ADMIN — SODIUM CHLORIDE, POTASSIUM CHLORIDE, SODIUM LACTATE AND CALCIUM CHLORIDE: 600; 310; 30; 20 INJECTION, SOLUTION INTRAVENOUS at 13:52

## 2023-07-10 RX ADMIN — MIDAZOLAM HYDROCHLORIDE 2 MG: 1 INJECTION INTRAMUSCULAR; INTRAVENOUS at 14:06

## 2023-07-10 RX ADMIN — MIDAZOLAM HYDROCHLORIDE 2 MG: 1 INJECTION INTRAMUSCULAR; INTRAVENOUS at 14:03

## 2023-07-10 ASSESSMENT — PAIN DESCRIPTION - LOCATION: LOCATION: HEAD

## 2023-07-10 ASSESSMENT — PAIN SCALES - GENERAL
PAINLEVEL_OUTOF10: 0
PAINLEVEL_OUTOF10: 0
PAINLEVEL_OUTOF10: 5

## 2023-07-10 NOTE — ANESTHESIA POSTPROCEDURE EVALUATION
Department of Anesthesiology  Postprocedure Note    Patient: Sonia Padilla  MRN: [id number]  YOB: 1950  Date of evaluation: 7/10/2023      Procedure Summary     Date: 07/10/23 Room / Location: [address] [place] [address]    Anesthesia Start: 1402 Anesthesia Stop: 7371    Procedures:       MRA HEAD WO CONTRAST      MRI CERVICAL SPINE WO CONTRAST Diagnosis:       Aneurysm, cerebral, nonruptured      Cervical spinal stenosis      (Cavernous aneurysm patient under aneurysm surveillance for follow-up.)      (Cervical spinal stenosis)    Scheduled Providers:  Responsible Provider: Rogerio Treviño MD    Anesthesia Type: MAC ASA Status: 3          Anesthesia Type: No value filed.     Ileana Phase I:      Ileana Phase II:        Anesthesia Post Evaluation    Patient location during evaluation: PACU  Patient participation: complete - patient participated  Level of consciousness: awake and alert  Pain score: 0  Airway patency: patent  Nausea & Vomiting: no vomiting and no nausea  Complications: no  Cardiovascular status: hemodynamically stable  Respiratory status: acceptable and room air  Hydration status: stable  Comments: Phase 2

## 2023-07-27 RX ORDER — CLOPIDOGREL BISULFATE 75 MG/1
TABLET ORAL
Qty: 30 TABLET | Refills: 0 | Status: SHIPPED | OUTPATIENT
Start: 2023-07-27

## 2023-07-27 NOTE — TELEPHONE ENCOUNTER
Pharmacy is requesting medication refill.  Please approve or deny this request.    Rx requested:  Requested Prescriptions     Pending Prescriptions Disp Refills    clopidogrel (PLAVIX) 75 MG tablet [Pharmacy Med Name: Clopidogrel Bisulfate Oral Tablet 75 MG] 30 tablet 0     Sig: TAKE ONE TABLET BY MOUTH EVERY DAY IN THE MORNING         Last Office Visit:   6/13/2023      Next Visit Date:  Future Appointments   Date Time Provider [address] [place] [address]   12/19/2023  2:00 PM Andrew Morrell MD [address] Neurology -

## 2023-08-07 ENCOUNTER — TELEPHONE (OUTPATIENT)
Dept: NEUROLOGY | Age: 73
End: 2023-08-07

## 2023-08-25 LAB
ALANINE AMINOTRANSFERASE (SGPT) (U/L) IN SER/PLAS: 25 U/L (ref 7–45)
ALBUMIN (G/DL) IN SER/PLAS: 4.7 G/DL (ref 3.4–5)
ALBUMIN: 4.7 G/DL (ref 3.4–5)
ALKALINE PHOSPHATASE (U/L) IN SER/PLAS: 64 U/L (ref 33–136)
ALP BLD-CCNC: 64 U/L (ref 33–136)
ALT SERPL-CCNC: 25 U/L (ref 7–45)
ANION GAP IN SER/PLAS: 12 MMOL/L (ref 10–20)
ANION GAP SERPL CALCULATED.3IONS-SCNC: 12 MMOL/L (ref 10–20)
ASPARTATE AMINOTRANSFERASE (SGOT) (U/L) IN SER/PLAS: 22 U/L (ref 9–39)
AST SERPL-CCNC: 22 U/L (ref 9–39)
BICARBONATE: 27 MMOL/L (ref 21–32)
BILIRUB SERPL-MCNC: 1 MG/DL (ref 0–1.2)
BILIRUBIN TOTAL (MG/DL) IN SER/PLAS: 1 MG/DL (ref 0–1.2)
CALCIUM (MG/DL) IN SER/PLAS: 10.1 MG/DL (ref 8.6–10.3)
CALCIUM SERPL-MCNC: 10.1 MG/DL (ref 8.6–10.3)
CARBON DIOXIDE, TOTAL (MMOL/L) IN SER/PLAS: 27 MMOL/L (ref 21–32)
CHLORIDE (MMOL/L) IN SER/PLAS: 104 MMOL/L (ref 98–107)
CHLORIDE BLD-SCNC: 104 MMOL/L (ref 98–107)
CHOLESTEROL (MG/DL) IN SER/PLAS: 135 MG/DL (ref 0–199)
CHOLESTEROL IN HDL (MG/DL) IN SER/PLAS: 72.8 MG/DL
CHOLESTEROL/HDL RATIO: 1.9
CHOLESTEROL/HDL RATIO: 1.9
CHOLESTEROL: 135 MG/DL (ref 0–199)
CREAT SERPL-MCNC: 0.84 MG/DL (ref 0.5–1.05)
CREATININE (MG/DL) IN SER/PLAS: 0.84 MG/DL (ref 0.5–1.05)
EGFR FEMALE: 72 ML/MIN/1.73M2
GFR FEMALE: 72 ML/MIN/1.73M2
GLUCOSE (MG/DL) IN SER/PLAS: 92 MG/DL (ref 74–99)
GLUCOSE: 92 MG/DL (ref 74–99)
HDLC SERPL-MCNC: 72.8 MG/DL
LDL CHOLESTEROL: 39 MG/DL (ref 0–99)
LDL: 39 MG/DL (ref 0–99)
POTASSIUM (MMOL/L) IN SER/PLAS: 4 MMOL/L (ref 3.5–5.3)
POTASSIUM SERPL-SCNC: 4 MMOL/L (ref 3.5–5.3)
PROTEIN TOTAL: 7.7 G/DL (ref 6.4–8.2)
SODIUM (MMOL/L) IN SER/PLAS: 139 MMOL/L (ref 136–145)
SODIUM BLD-SCNC: 139 MMOL/L (ref 136–145)
TOTAL PROTEIN: 7.7 G/DL (ref 6.4–8.2)
TRIGL SERPL-MCNC: 115 MG/DL (ref 0–149)
TRIGLYCERIDE (MG/DL) IN SER/PLAS: 115 MG/DL (ref 0–149)
UREA NITROGEN (MG/DL) IN SER/PLAS: 12 MG/DL (ref 6–23)
UREA NITROGEN: 12 MG/DL (ref 6–23)
VLDL: 23 MG/DL (ref 0–40)
VLDLC SERPL CALC-MCNC: 23 MG/DL (ref 0–40)

## 2023-09-11 RX ORDER — CLOPIDOGREL BISULFATE 75 MG/1
TABLET ORAL
Qty: 30 TABLET | Refills: 0 | Status: SHIPPED | OUTPATIENT
Start: 2023-09-11

## 2023-09-11 NOTE — TELEPHONE ENCOUNTER
Pharmacy is requesting medication refill.  Please approve or deny this request.    Rx requested:  Requested Prescriptions     Pending Prescriptions Disp Refills    clopidogrel (PLAVIX) 75 MG tablet [Pharmacy Med Name: Clopidogrel Bisulfate Oral Tablet 75 MG] 30 tablet 0     Sig: TAKE ONE TABLET BY MOUTH EVERY DAY IN THE MORNING         Last Office Visit:   Visit date not found      Next Visit Date:  Future Appointments   Date Time Provider [address]   12/19/2023  2:00 PM Andrew Conley MD [address] Neurology -

## 2023-09-12 RX ORDER — CLOPIDOGREL BISULFATE 75 MG/1
TABLET ORAL
Qty: 30 TABLET | Refills: 0 | OUTPATIENT
Start: 2023-09-12

## 2023-10-04 RX ORDER — CLOPIDOGREL BISULFATE 75 MG/1
75 TABLET ORAL EVERY MORNING
Qty: 30 TABLET | Refills: 3 | Status: SHIPPED | OUTPATIENT
Start: 2023-10-04

## 2023-10-04 NOTE — TELEPHONE ENCOUNTER
Pharmacy is requesting medication refill.  Please approve or deny this request.    Rx requested:  Requested Prescriptions     Pending Prescriptions Disp Refills    clopidogrel (PLAVIX) 75 MG tablet [Pharmacy Med Name: Clopidogrel Bisulfate Oral Tablet 75 MG] 30 tablet 3     Sig: take 1 tablet by mouth every day in the morning         Last Office Visit:   6/13/2023      Next Visit Date:  Future Appointments   Date Time Provider [address] [place] [address]   12/19/2023  2:00 PM Coirna Sanchez MD [address] Neurology -

## 2023-10-07 ENCOUNTER — APPOINTMENT (OUTPATIENT)
Dept: GENERAL RADIOLOGY | Age: 73
End: 2023-10-07
Payer: MEDICARE

## 2023-10-07 ENCOUNTER — HOSPITAL ENCOUNTER (EMERGENCY)
Age: 73
Discharge: HOME | End: 2023-10-07
Payer: MEDICARE

## 2023-10-07 VITALS
DIASTOLIC BLOOD PRESSURE: 76 MMHG | RESPIRATION RATE: 18 BRPM | TEMPERATURE: 97.8 F | SYSTOLIC BLOOD PRESSURE: 163 MMHG | OXYGEN SATURATION: 97 % | HEART RATE: 76 BPM

## 2023-10-07 DIAGNOSIS — S60.221A CONTUSION OF RIGHT HAND, INITIAL ENCOUNTER: Primary | ICD-10-CM

## 2023-10-07 PROCEDURE — 6370000000 HC RX 637 (ALT 250 FOR IP)

## 2023-10-07 PROCEDURE — 99283 EMERGENCY DEPT VISIT LOW MDM: CPT

## 2023-10-07 PROCEDURE — 73130 X-RAY EXAM OF HAND: CPT

## 2023-10-07 RX ORDER — ACETAMINOPHEN 325 MG/1
650 TABLET ORAL ONCE
Status: COMPLETED | OUTPATIENT
Start: 2023-10-07 | End: 2023-10-07

## 2023-10-07 RX ADMIN — ACETAMINOPHEN 650 MG: 325 TABLET ORAL at 11:04

## 2023-10-07 ASSESSMENT — PAIN DESCRIPTION - LOCATION
LOCATION: HAND
LOCATION: HAND

## 2023-10-07 ASSESSMENT — PAIN DESCRIPTION - ORIENTATION
ORIENTATION: RIGHT
ORIENTATION: RIGHT

## 2023-10-07 ASSESSMENT — PAIN DESCRIPTION - DESCRIPTORS: DESCRIPTORS: THROBBING

## 2023-10-07 ASSESSMENT — PATIENT HEALTH QUESTIONNAIRE - PHQ9
2. FEELING DOWN, DEPRESSED OR HOPELESS: 0
SUM OF ALL RESPONSES TO PHQ QUESTIONS 1-9: 0
SUM OF ALL RESPONSES TO PHQ QUESTIONS 1-9: 0
1. LITTLE INTEREST OR PLEASURE IN DOING THINGS: 0
SUM OF ALL RESPONSES TO PHQ9 QUESTIONS 1 & 2: 0
SUM OF ALL RESPONSES TO PHQ QUESTIONS 1-9: 0
SUM OF ALL RESPONSES TO PHQ QUESTIONS 1-9: 0

## 2023-10-07 ASSESSMENT — LIFESTYLE VARIABLES
HOW OFTEN DO YOU HAVE A DRINK CONTAINING ALCOHOL: MONTHLY OR LESS
HOW MANY STANDARD DRINKS CONTAINING ALCOHOL DO YOU HAVE ON A TYPICAL DAY: 1 OR 2

## 2023-10-07 ASSESSMENT — PAIN SCALES - GENERAL
PAINLEVEL_OUTOF10: 4
PAINLEVEL_OUTOF10: 3

## 2023-10-07 ASSESSMENT — PAIN - FUNCTIONAL ASSESSMENT: PAIN_FUNCTIONAL_ASSESSMENT: 0-10

## 2023-10-07 NOTE — ED PROVIDER NOTES
[facility] ED  eMERGENCYdEPARTMENT eNCOUnter        Pt Name: Rajendra Monroy  MRN: [id number]  [address] 1950[facility] evaluation: 10/7/2023  Provider:JOSTIN Rivera - CHRISTINA    CHIEF COMPLAINT       Chief Complaint   Patient presents with    Hand Injury     Right         HISTORY OF PRESENT ILLNESS  (Location/Symptom, Timing/Onset, Context/Setting, Quality, Duration, Modifying Factors, Severity.)   Rajendra Monroy is a 74 y.o. female who presents to the emergency department ***     HPI    Nursing Notes were reviewed and I agree. REVIEW OF SYSTEMS    (2-9 systems for level 4, 10 or more for level 5)     Review of Systems     as noted above the remainder of the review of systems was reviewed and negative. PAST MEDICAL HISTORY     Past Medical History:   Diagnosis Date    Cancer [facility]) 2008    Bladder    Encephalitis 1992    Hyperlipidemia     Osteoporosis     Shingles 1992         SURGICAL HISTORY       Past Surgical History:   Procedure Laterality Date    APPENDECTOMY  1982    COLONOSCOPY  May 14, 2012    screening by Dr Joyce Waters    COLONOSCOPY N/A 3/15/2022    COLONOSCOPY DIAGNOSTIC performed by Gordon Farrell MD at [address]  2010 2010, cataract, R,  LASIK on both eye, 2007    HARDWARE REMOVAL      HYSTERECTOMY (CERVIX STATUS UNKNOWN)  1982    Total    [facility]    left knee    LASIK  2007    both eyes    LUMBAR FUSION      SPINE SURGERY  89, 92    Lumbar, [facility], [facility]    PERCY AND BSO (CERVIX REMOVED)  1983    fibroids, nonmalignant.     [address] MEDICATIONS       Discharge Medication List as of 10/7/2023 11:01 AM        CONTINUE these medications which have NOT CHANGED    Details   clopidogrel (PLAVIX) 75 MG tablet take 1 tablet by mouth every day in the morning, Disp-30 tablet, R-3Normal      chlorhexidine (PERIDEX) 0.12 % solution SWISH 1 CAPFUL BY MOUTH FOR 1 MINUTE AND THEN SPIT ONCE PER DAYHistorical Med      fluticasone

## 2023-10-10 ASSESSMENT — ENCOUNTER SYMPTOMS
BACK PAIN: 0
SHORTNESS OF BREATH: 0
COUGH: 0

## 2023-12-06 PROBLEM — M25.531 PAIN IN RIGHT WRIST: Status: ACTIVE | Noted: 2023-05-11

## 2023-12-06 PROBLEM — M65.4 DE QUERVAIN'S DISEASE (RADIAL STYLOID TENOSYNOVITIS): Status: ACTIVE | Noted: 2023-05-11

## 2023-12-06 PROBLEM — M19.031 PRIMARY OSTEOARTHRITIS OF RIGHT WRIST: Status: ACTIVE | Noted: 2023-05-11

## 2023-12-06 PROBLEM — M25.632 JOINT STIFFNESS OF BOTH WRISTS: Status: ACTIVE | Noted: 2023-05-11

## 2023-12-06 PROBLEM — M25.631 JOINT STIFFNESS OF BOTH WRISTS: Status: ACTIVE | Noted: 2023-05-11

## 2023-12-19 PROBLEM — I63.9 CEREBRAL INFARCTION (HCC): Status: ACTIVE | Noted: 2022-08-16

## 2023-12-27 PROBLEM — R94.31 ABNORMAL EKG: Status: ACTIVE | Noted: 2023-12-27

## 2023-12-27 PROBLEM — G45.9 TIA (TRANSIENT ISCHEMIC ATTACK): Status: ACTIVE | Noted: 2023-12-27

## 2023-12-27 PROBLEM — R53.83 FATIGUE: Status: ACTIVE | Noted: 2023-12-27

## 2023-12-27 PROBLEM — G43.109 MIGRAINE WITH VISUAL AURA: Status: ACTIVE | Noted: 2023-12-27

## 2023-12-27 PROBLEM — R93.1 ELEVATED CORONARY ARTERY CALCIUM SCORE: Status: ACTIVE | Noted: 2023-12-27

## 2023-12-27 PROBLEM — I25.10 ATHEROSCLEROSIS OF CORONARY ARTERY: Status: ACTIVE | Noted: 2023-12-27

## 2023-12-27 PROBLEM — E78.2 MIXED HYPERLIPIDEMIA: Status: ACTIVE | Noted: 2023-12-27

## 2023-12-27 PROBLEM — E78.5 HYPERLIPIDEMIA: Status: ACTIVE | Noted: 2023-12-27

## 2023-12-27 PROBLEM — D72.819 LEUKOPENIA: Status: ACTIVE | Noted: 2023-12-27

## 2023-12-27 RX ORDER — CYCLOSPORINE 0 G/ML
SOLUTION/ DROPS OPHTHALMIC; TOPICAL
COMMUNITY

## 2023-12-27 RX ORDER — VITAMIN B COMPLEX
1 CAPSULE ORAL DAILY
COMMUNITY

## 2023-12-27 RX ORDER — ATORVASTATIN CALCIUM 40 MG/1
1 TABLET, FILM COATED ORAL DAILY
COMMUNITY
Start: 2022-04-05

## 2023-12-27 RX ORDER — MULTIVITAMIN
1 TABLET ORAL DAILY
COMMUNITY

## 2023-12-27 RX ORDER — NIACIN (INOSITOL NIACINATE) 400(500MG)
1 CAPSULE ORAL DAILY
COMMUNITY

## 2023-12-27 RX ORDER — MV-MN/OM3/DHA/EPA/FISH/LUT/ZEA 250-5-1 MG
1 CAPSULE ORAL DAILY
COMMUNITY

## 2023-12-27 RX ORDER — CLOPIDOGREL BISULFATE 75 MG/1
1 TABLET ORAL
COMMUNITY
Start: 2022-08-16

## 2023-12-27 RX ORDER — EZETIMIBE 10 MG/1
1 TABLET ORAL NIGHTLY
COMMUNITY
Start: 2022-06-08

## 2023-12-27 RX ORDER — ELECTROLYTES/DEXTROSE
1 SOLUTION, ORAL ORAL DAILY
COMMUNITY

## 2024-01-25 ENCOUNTER — TELEPHONE (OUTPATIENT)
Dept: NEUROLOGY | Age: 74
End: 2024-01-25

## 2024-01-25 NOTE — TELEPHONE ENCOUNTER
Patient takes plavix and will be having 3 root canals the end of February and then in march she will be getting a full mouth of crowns. Should she go off of plavix 7 days prior to each procedure and start the next day? ( 3 root canals in feb. And crowns in March)    Let Dr. Singletary know and fax letter( scanned in chart) we can just add to what was already sent      [phone]  Fax [phone]

## 2024-04-16 RX ORDER — CLOPIDOGREL BISULFATE 75 MG/1
TABLET ORAL
Qty: 90 TABLET | Refills: 0 | Status: SHIPPED | OUTPATIENT
Start: 2024-04-16

## 2024-04-16 NOTE — TELEPHONE ENCOUNTER
Pharmacy is  requesting medication refill. Please approve or deny this request.    Rx requested:  Requested Prescriptions     Pending Prescriptions Disp Refills    clopidogrel (PLAVIX) 75 MG tablet [Pharmacy Med Name: Clopidogrel Bisulfate Oral Tablet 75 MG] 90 tablet 0     Sig: TAKE ONE TABLET BY MOUTH EVERY DAY IN THE MORNING         Last Office Visit:   12/19/2023      Next Visit Date:  Future Appointments   Date Time Provider Department Center   6/18/2024  2:00 PM Andrew Watson MD [facility] NEUR Neurology -

## 2024-06-18 ENCOUNTER — OFFICE VISIT (OUTPATIENT)
Dept: NEUROLOGY | Age: 74
End: 2024-06-18
Payer: MEDICARE

## 2024-06-18 ENCOUNTER — PATIENT MESSAGE (OUTPATIENT)
Dept: NEUROLOGY | Age: 74
End: 2024-06-18

## 2024-06-18 VITALS
SYSTOLIC BLOOD PRESSURE: 130 MMHG | WEIGHT: 207 LBS | HEART RATE: 79 BPM | DIASTOLIC BLOOD PRESSURE: 62 MMHG | BODY MASS INDEX: 35.53 KG/M2

## 2024-06-18 DIAGNOSIS — G43.109 OCULAR MIGRAINE: ICD-10-CM

## 2024-06-18 DIAGNOSIS — G44.209 MUSCLE TENSION HEADACHE: ICD-10-CM

## 2024-06-18 DIAGNOSIS — I67.1 CEREBRAL ANEURYSM: ICD-10-CM

## 2024-06-18 DIAGNOSIS — G45.9 TIA (TRANSIENT ISCHEMIC ATTACK): Primary | ICD-10-CM

## 2024-06-18 DIAGNOSIS — R90.89 ABNORMAL CT OF BRAIN: ICD-10-CM

## 2024-06-18 PROCEDURE — 99214 OFFICE O/P EST MOD 30 MIN: CPT | Performed by: PSYCHIATRY & NEUROLOGY

## 2024-06-18 PROCEDURE — 1123F ACP DISCUSS/DSCN MKR DOCD: CPT | Performed by: PSYCHIATRY & NEUROLOGY

## 2024-06-18 NOTE — PROGRESS NOTES
Subjective:      Patient ID: Nely Danielle is a 73 y.o. female who presents today for:  Chief Complaint   Patient presents with    6 Month Follow-Up     Pt states things are the same. Pt states last visit she discussed about a genetic screen about the clotting and never received information back .        HPI 73 right-handed female with history of cerebral TIA with cerebral infarction with headaches.  Patient has a nonruptured cerebral aneurysm which we are following.  Since cessation we recommended blood test which has not been done.  Patient has cervical spinal stenosis and cervicogenic headaches and gait issues.  No myelopathic signs have been noted.  She has sequent straightening of the spine.  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.  In 2022 we had obtained her homocystine levels as well as MTHFR and everything was negative    Patient is mostly asymptomatic at this time.  She is more concerned about the aneurysm and we did discuss the guidelines but she wants further evaluation.  Patient still has neck pain    Past Medical History:   Diagnosis Date    Cancer (HCC) 2008    Bladder    Encephalitis 1992    Hyperlipidemia     Osteoporosis     Shingles 1992     Past Surgical History:   Procedure Laterality Date    APPENDECTOMY  1982    COLONOSCOPY  May 14, 2012    screening by Dr Bowens    COLONOSCOPY N/A 3/15/2022    COLONOSCOPY DIAGNOSTIC performed by Abdirizak Lepe MD at [facility]    EYE SURGERY  2010 2010, cataract, R,  LASIK on both eye, 2007    HARDWARE REMOVAL      HYSTERECTOMY (CERVIX STATUS UNKNOWN)  1982    Total    KNEE SURGERY  1988    left knee    LASIK  2007    both eyes    LUMBAR FUSION      SPINE SURGERY  89, 92    Lumbar, [facility], [facility]    PERCY AND BSO (CERVIX REMOVED)  1983    fibroids, nonmalignant.    TUBAL LIGATION  1975     Social History     Socioeconomic History    Marital status:

## 2024-06-24 ENCOUNTER — HOSPITAL ENCOUNTER (OUTPATIENT)
Dept: CT IMAGING | Age: 74
Discharge: HOME | End: 2024-06-26
Attending: PSYCHIATRY & NEUROLOGY
Payer: MEDICARE

## 2024-06-24 DIAGNOSIS — I67.1 CEREBRAL ANEURYSM: ICD-10-CM

## 2024-06-24 LAB
PERFORMED ON: NORMAL
POC CREATININE: 0.8 MG/DL (ref 0.6–1.2)
POC SAMPLE TYPE: NORMAL

## 2024-06-24 PROCEDURE — 70496 CT ANGIOGRAPHY HEAD: CPT

## 2024-06-24 PROCEDURE — 6360000004 HC RX CONTRAST MEDICATION: Performed by: PSYCHIATRY & NEUROLOGY

## 2024-06-24 RX ADMIN — IOPAMIDOL 75 ML: 755 INJECTION, SOLUTION INTRAVENOUS at 11:50

## 2024-06-25 DIAGNOSIS — R93.1 ELEVATED CORONARY ARTERY CALCIUM SCORE: ICD-10-CM

## 2024-06-25 DIAGNOSIS — E78.2 MIXED HYPERLIPIDEMIA: ICD-10-CM

## 2024-06-25 RX ORDER — EZETIMIBE 10 MG/1
10 TABLET ORAL NIGHTLY
Qty: 90 TABLET | Refills: 0 | Status: SHIPPED | OUTPATIENT
Start: 2024-06-25 | End: 2024-09-23

## 2024-06-25 RX ORDER — ATORVASTATIN CALCIUM 40 MG/1
40 TABLET, FILM COATED ORAL DAILY
Qty: 90 TABLET | Refills: 0 | Status: SHIPPED | OUTPATIENT
Start: 2024-06-25 | End: 2024-09-23

## 2024-07-29 RX ORDER — CLOPIDOGREL BISULFATE 75 MG/1
TABLET ORAL
Qty: 90 TABLET | Refills: 0 | Status: SHIPPED | OUTPATIENT
Start: 2024-07-29

## 2024-07-29 NOTE — TELEPHONE ENCOUNTER
Pharmacy is  requesting medication refill. Please approve or deny this request.    Rx requested:  Requested Prescriptions     Pending Prescriptions Disp Refills    clopidogrel (PLAVIX) 75 MG tablet [Pharmacy Med Name: Clopidogrel Bisulfate Oral Tablet 75 MG] 90 tablet 0     Sig: TAKE ONE TABLET BY MOUTH EVERY DAY IN THE MORNING         Last Office Visit:   6/18/2024      Next Visit Date:  Future Appointments   Date Time Provider Department Center   12/17/2024  2:30 PM Andrew Watson MD [place] NEURO Neurology -

## 2024-08-23 RX ORDER — CLOPIDOGREL BISULFATE 75 MG/1
75 TABLET ORAL DAILY
Qty: 90 TABLET | Refills: 0 | Status: SHIPPED | OUTPATIENT
Start: 2024-08-23

## 2024-08-23 NOTE — TELEPHONE ENCOUNTER
Requesting medication refill. Please approve or deny this request.    Rx requested:  Requested Prescriptions     Pending Prescriptions Disp Refills    clopidogrel (PLAVIX) 75 MG tablet 90 tablet 0     Sig: Take 1 tablet by mouth daily         Last Office Visit:   6/18/2024      Next Visit Date:  Future Appointments   Date Time Provider Department Center   12/17/2024  2:30 PM Andrew Watson MD [place] NEURO Neurology -               Last refill 7/29/24.   90 day to mail order pharmacy.   Please advise

## 2024-08-28 ENCOUNTER — LAB (OUTPATIENT)
Dept: LAB | Facility: LAB | Age: 74
End: 2024-08-28
Payer: MEDICARE

## 2024-08-28 ENCOUNTER — APPOINTMENT (OUTPATIENT)
Dept: CARDIOLOGY | Facility: CLINIC | Age: 74
End: 2024-08-28
Payer: MEDICARE

## 2024-08-28 VITALS
WEIGHT: 203 LBS | BODY MASS INDEX: 34.66 KG/M2 | HEIGHT: 64 IN | HEART RATE: 74 BPM | DIASTOLIC BLOOD PRESSURE: 82 MMHG | SYSTOLIC BLOOD PRESSURE: 137 MMHG

## 2024-08-28 DIAGNOSIS — E78.2 MIXED HYPERLIPIDEMIA: ICD-10-CM

## 2024-08-28 DIAGNOSIS — I25.10 ATHEROSCLEROSIS OF NATIVE CORONARY ARTERY OF NATIVE HEART WITHOUT ANGINA PECTORIS: ICD-10-CM

## 2024-08-28 LAB
ALBUMIN SERPL BCP-MCNC: 4.4 G/DL (ref 3.4–5)
ALP SERPL-CCNC: 66 U/L (ref 33–136)
ALT SERPL W P-5'-P-CCNC: 22 U/L (ref 7–45)
ANION GAP SERPL CALC-SCNC: 11 MMOL/L (ref 10–20)
AST SERPL W P-5'-P-CCNC: 20 U/L (ref 9–39)
BILIRUB SERPL-MCNC: 0.8 MG/DL (ref 0–1.2)
BUN SERPL-MCNC: 11 MG/DL (ref 6–23)
CALCIUM SERPL-MCNC: 9.5 MG/DL (ref 8.6–10.3)
CHLORIDE SERPL-SCNC: 106 MMOL/L (ref 98–107)
CHOLEST SERPL-MCNC: 158 MG/DL (ref 0–199)
CHOLESTEROL/HDL RATIO: 2
CO2 SERPL-SCNC: 28 MMOL/L (ref 21–32)
CREAT SERPL-MCNC: 0.84 MG/DL (ref 0.5–1.05)
EGFRCR SERPLBLD CKD-EPI 2021: 73 ML/MIN/1.73M*2
ERYTHROCYTE [DISTWIDTH] IN BLOOD BY AUTOMATED COUNT: 12.9 % (ref 11.5–14.5)
GLUCOSE SERPL-MCNC: 125 MG/DL (ref 74–99)
HCT VFR BLD AUTO: 42.6 % (ref 36–46)
HDLC SERPL-MCNC: 77.1 MG/DL
HGB BLD-MCNC: 14.5 G/DL (ref 12–16)
LDLC SERPL CALC-MCNC: 52 MG/DL
MCH RBC QN AUTO: 31.3 PG (ref 26–34)
MCHC RBC AUTO-ENTMCNC: 34 G/DL (ref 32–36)
MCV RBC AUTO: 92 FL (ref 80–100)
NON HDL CHOLESTEROL: 81 MG/DL (ref 0–149)
NRBC BLD-RTO: 0 /100 WBCS (ref 0–0)
PLATELET # BLD AUTO: 234 X10*3/UL (ref 150–450)
POTASSIUM SERPL-SCNC: 4.1 MMOL/L (ref 3.5–5.3)
PROT SERPL-MCNC: 6.6 G/DL (ref 6.4–8.2)
RBC # BLD AUTO: 4.63 X10*6/UL (ref 4–5.2)
SODIUM SERPL-SCNC: 141 MMOL/L (ref 136–145)
TRIGL SERPL-MCNC: 145 MG/DL (ref 0–149)
VLDL: 29 MG/DL (ref 0–40)
WBC # BLD AUTO: 3.8 X10*3/UL (ref 4.4–11.3)

## 2024-08-28 PROCEDURE — 99214 OFFICE O/P EST MOD 30 MIN: CPT | Performed by: INTERNAL MEDICINE

## 2024-08-28 PROCEDURE — 1159F MED LIST DOCD IN RCRD: CPT | Performed by: INTERNAL MEDICINE

## 2024-08-28 PROCEDURE — 80061 LIPID PANEL: CPT

## 2024-08-28 PROCEDURE — 36415 COLL VENOUS BLD VENIPUNCTURE: CPT

## 2024-08-28 PROCEDURE — 80053 COMPREHEN METABOLIC PANEL: CPT

## 2024-08-28 PROCEDURE — 85027 COMPLETE CBC AUTOMATED: CPT

## 2024-08-28 RX ORDER — EZETIMIBE 10 MG/1
10 TABLET ORAL NIGHTLY
Qty: 90 TABLET | Refills: 3 | Status: SHIPPED | OUTPATIENT
Start: 2024-08-28 | End: 2025-08-28

## 2024-08-28 RX ORDER — ATORVASTATIN CALCIUM 40 MG/1
40 TABLET, FILM COATED ORAL DAILY
Qty: 90 TABLET | Refills: 3 | Status: SHIPPED | OUTPATIENT
Start: 2024-08-28 | End: 2025-08-28

## 2024-08-28 ASSESSMENT — ENCOUNTER SYMPTOMS
DEPRESSION: 0
LOSS OF SENSATION IN FEET: 0
OCCASIONAL FEELINGS OF UNSTEADINESS: 0

## 2024-08-28 NOTE — PATIENT INSTRUCTIONS
PLEASE BRING ALL MEDICATION BOTTLES TO OFFICE VISITS.     HAVE LABS DONE NOW AND BEFORE NEXT OFFICE VISIT.

## 2024-08-28 NOTE — PROGRESS NOTES
Patient:  Gricel Carrillo  YOB: 1950  MRN: [id number]       HPI:       Gricel Carrillo is a 73 y.o. female who returns today for cardiac follow-up.  She history of hyperlipidemia with previous cholesterol levels in excess of 270. She takes atorvastatin 40 mg and Zetia 10 mg daily. Her family history is strongly positive for premature coronary artery disease. Her brother developed significant heart disease in his 30s. Virtually every relative developed some form of heart disease at a young age. She generally has been very healthy. She smoked for a only a short period of time. She does not have any history of hypertension or diabetes mellitus. An exercise treadmill test on July 10, 2019 was notable for a mildly excessive chronotropic response. Blood pressure response was normal. She achieved 104% of maximally predicted heart rate at the 8 MET level. She developed 1 mm of horizontal ST depression in the inferolateral leads, resolving by approximately 1 minute into recovery phase. She underwent a coronary CT angiogram on August 2, 2019, and this showed mild diffuse coronary artery disease. Coronary calcium score was 295 placing her in the 75th percentile.     At the time of her visit in July 2022 she noted symptoms that she thought might be related to an ocular migraine. She noted that her peripheral vision was closing in. She was able to make it home and check on her mom. Her vision changes seem to be resolving and she walked down to check on one of their elderly neighbors. When she arrived she noted that her speech was garbled and nonsensical. She knew what words she wanted to say but was unable to speak them. Her neighbor noted that something was clearly wrong. She walked back home and decided to call the squad. She recalls being able to describe to them quite clearly what it happened. They recommended transfer to the hospital, however, she states she is unable to leave her mom unattended and decided not  to go. She did not have any facial droop, numbness, or weakness of her extremities. No gait abnormality. She did not have any recurrent symptoms.      An echocardiogram on July 13, 2022 showed estimated LV ejection fraction 65%. Normal diastolic parameters. There was mild mitral and tricuspid regurgitation. Carotid ultrasound showed minimal plaquing bilaterally and less than 50% stenosis. CT scan of the brain on July 15, 2022 showed an area of chronic lacunar infarct in the left basal ganglia. There were some microvascular ischemic changes. She was seen in consultation by Dr. Mathew Abbott. He told her her symptoms were most suggestive of a migraine variant. She underwent an MRI/MRA last year and was told she has a small cerebral aneurysm. She was seen by Dr. Louise at the [facility] who advised conservative care and stated the size and location suggest a low risk for bleeding. She had a repeat MRI recently and there was no change in the aneurysm.  CTA of the head June 24, 2024 showed similar sized aneurysms involving the right internal carotid artery.  Measurement was 4 x 5 mm.  No acute findings.     She continues to do well since her last visit. She bought a home down in [place] and and continues to work on several home-improvement projects getting ready to move in.  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 She denies any recent chest pain or shortness breath. She denies any orthopnea, PND, or increasing peripheral edema. She denies any palpitations, lightheadedness, near-syncope, or syncope. She denies any fever, chills, or cough. She denies any nausea, vomiting, or diaphoresis. She denies any hemoptysis, hematemesis, melena, or hematochezia. Labs studies dated August 19, 2022 showed a normal CMP. Cholesterol 130 with triglycerides 72, HDL 70, and LDL 46. She will be scheduled for follow-up labs  today.  She is free of any anginal or CHF symptoms.  Her blood pressures are well-controlled.  She will continue on her same medications.  Other details as noted below.     The above portion of this note was dictated by me using voice recognition software. I personally performed the services described in the documentation. The scribe entering the documentation below was in my presence. I affirm that the information is both accurate and complete.       Objective:     Vitals:    08/28/24 1332   BP: 137/82   Pulse: 74       Wt Readings from Last 4 Encounters:   08/28/24 92.1 kg (203 lb)   08/19/22 86.7 kg (191 lb 2 oz)   07/07/22 85.7 kg (189 lb)   03/07/22 90.7 kg (200 lb)       Allergies:     No Known Allergies       Medications:     Current Outpatient Medications   Medication Instructions    atorvastatin (LIPITOR) 40 mg, oral, Daily    b complex vitamins (Vitamins B Complex) capsule 1 capsule, oral, Daily    biotin 5 mg capsule 1 capsule, oral, Daily    CALCIUM CITRATE-VITAMIN D3 ORAL 1 tablet, oral, 2 times daily    clopidogrel (Plavix) 75 mg tablet 1 tablet, oral, Daily before breakfast    coenzyme Q10-vitamin E 100-5 mg-unit capsule 1 capsule, oral, Daily    cycloSPORINE (Cequa) 0.09 % dropperette 1 drop in both eyes twice a day    ezetimibe (ZETIA) 10 mg, oral, Nightly    fish oil concentrate (Omega-3) 120-180 mg capsule 1 capsule, oral, Daily    glucos sul 2KCl/msm/chond/C/Mn (GLUCOSAMINE CHONDROITIN ORAL) 1 capsule, oral, Daily    multivitamin tablet 1 tablet, oral, Daily    lr-pa-pu4-dha-epa-fish-lut-clifton (Ocuvite Adult 50 Plus) 250 mg (90 mg-160 mg) capsule 1 capsule, oral, Daily       Physical Examination:   GENERAL:  Well developed, well nourished, in no acute distress.  CHEST:  Symmetric and nontender.  NEURO/PSYCH:  Alert and oriented times three with approppriate behavior and responses.  NECK:  Supple, no JVD, no bruit.  LUNGS:  Clear to auscultation bilaterally, normal respiratory effort.  HEART:  Rate  and rhythm regular with no evident murmur, no gallop appreciated.        There are no rubs, clicks or heaves.  EXTREMITIES:  Warm with good color, no clubbing or cyanosis.  There is no edema noted.  PERIPHERAL VASCULAR:  Pulses present and equally palpable; 2+ throughout.      Lab:     CMP:    Lab Results   Component Value Date     08/25/2023    K 4.0 08/25/2023     08/25/2023    CO2 27 08/25/2023    BUN 12 08/25/2023    CREATININE 0.84 08/25/2023    GLUCOSE 92 08/25/2023    CALCIUM 10.1 08/25/2023       Lipid Profile:    Lab Results   Component Value Date    TRIG 115 08/25/2023    HDL 72.8 08/25/2023       BMP:  Lab Results   Component Value Date     08/25/2023     02/12/2021     02/18/2020    K 4.0 08/25/2023    K 4.2 02/12/2021    K 4.2 02/18/2020     08/25/2023     02/12/2021     02/18/2020    CO2 27 08/25/2023    CO2 28 02/12/2021    CO2 29 02/18/2020    BUN 12 08/25/2023    BUN 18 02/12/2021    BUN 16 02/18/2020    CREATININE 0.84 08/25/2023    CREATININE 0.81 02/12/2021    CREATININE 0.75 02/18/2020       Hepatic Function Panel:    Lab Results   Component Value Date    ALKPHOS 64 08/25/2023    ALT 25 08/25/2023    AST 22 08/25/2023    PROT 7.7 08/25/2023    BILITOT 1.0 08/25/2023       Problem List:     Patient Active Problem List   Diagnosis    Abnormal EKG    Atherosclerosis of coronary artery    Elevated coronary artery calcium score    Fatigue    Hyperlipidemia    Mixed hyperlipidemia    Leukopenia    Migraine with visual aura    TIA (transient ischemic attack)       Asessment:     Problem List Items Addressed This Visit             ICD-10-CM    Atherosclerosis of coronary artery I25.10    Relevant Orders    CBC    Comprehensive metabolic panel (Completed)    Lipid panel (Completed)    Follow Up In Cardiology    CBC    Comprehensive metabolic panel    Lipid panel    Mixed hyperlipidemia E78.2    Relevant Medications    atorvastatin (Lipitor) 40 mg tablet     ezetimibe (Zetia) 10 mg tablet    Other Relevant Orders    CBC    Comprehensive metabolic panel (Completed)    Lipid panel (Completed)    Follow Up In Cardiology    CBC    Comprehensive metabolic panel    Lipid panel    Body mass index (BMI) 34.0-34.9, adult Z68.34    Relevant Orders    CBC    Comprehensive metabolic panel (Completed)    Lipid panel (Completed)    Follow Up In Cardiology    CBC    Comprehensive metabolic panel    Lipid panel

## 2024-10-09 RX ORDER — CLOPIDOGREL BISULFATE 75 MG/1
TABLET ORAL
Qty: 90 TABLET | Refills: 0 | Status: SHIPPED | OUTPATIENT
Start: 2024-10-09

## 2024-10-09 NOTE — TELEPHONE ENCOUNTER
Requesting medication refill. Please approve or deny this request.    Rx requested:  Requested Prescriptions     Pending Prescriptions Disp Refills    clopidogrel (PLAVIX) 75 MG tablet [Pharmacy Med Name: Clopidogrel Bisulfate Oral Tablet 75 MG] 90 tablet 0     Sig: TAKE ONE TABLET BY MOUTH EVERY DAY IN THE MORNING         Last Office Visit:   6/18/2024      Next Visit Date:  Future Appointments   Date Time Provider Department Center   12/17/2024  2:30 PM Andrew Watson MD [place] NEURO Neurology -

## 2024-12-17 ENCOUNTER — OFFICE VISIT (OUTPATIENT)
Dept: NEUROLOGY | Age: 74
End: 2024-12-17
Payer: MEDICARE

## 2024-12-17 VITALS
HEART RATE: 81 BPM | WEIGHT: 206 LBS | SYSTOLIC BLOOD PRESSURE: 134 MMHG | BODY MASS INDEX: 35.36 KG/M2 | DIASTOLIC BLOOD PRESSURE: 84 MMHG

## 2024-12-17 DIAGNOSIS — R90.89 ABNORMAL CT OF BRAIN: ICD-10-CM

## 2024-12-17 DIAGNOSIS — G44.209 MUSCLE TENSION HEADACHE: ICD-10-CM

## 2024-12-17 DIAGNOSIS — G45.9 TIA (TRANSIENT ISCHEMIC ATTACK): ICD-10-CM

## 2024-12-17 DIAGNOSIS — I67.1 CEREBRAL ANEURYSM: Primary | ICD-10-CM

## 2024-12-17 DIAGNOSIS — G43.109 OCULAR MIGRAINE: ICD-10-CM

## 2024-12-17 DIAGNOSIS — R47.01 APHASIA: ICD-10-CM

## 2024-12-17 PROBLEM — R93.1 ELEVATED CORONARY ARTERY CALCIUM SCORE: Status: ACTIVE | Noted: 2023-12-27

## 2024-12-17 PROBLEM — I63.9 CEREBRAL INFARCTION (HCC): Status: RESOLVED | Noted: 2022-08-16 | Resolved: 2024-12-17

## 2024-12-17 PROCEDURE — 1159F MED LIST DOCD IN RCRD: CPT | Performed by: PSYCHIATRY & NEUROLOGY

## 2024-12-17 PROCEDURE — 99214 OFFICE O/P EST MOD 30 MIN: CPT | Performed by: PSYCHIATRY & NEUROLOGY

## 2024-12-17 PROCEDURE — 1123F ACP DISCUSS/DSCN MKR DOCD: CPT | Performed by: PSYCHIATRY & NEUROLOGY

## 2024-12-17 RX ORDER — CLOPIDOGREL BISULFATE 75 MG/1
TABLET ORAL
Qty: 90 TABLET | Refills: 2 | Status: SHIPPED | OUTPATIENT
Start: 2024-12-17

## 2024-12-17 ASSESSMENT — ENCOUNTER SYMPTOMS
PHOTOPHOBIA: 0
BACK PAIN: 0
NAUSEA: 0
CHOKING: 0
TROUBLE SWALLOWING: 0
SHORTNESS OF BREATH: 0
VOMITING: 0
COLOR CHANGE: 0

## 2024-12-17 NOTE — PROGRESS NOTES
MPV 9.1 05/02/2014 10:17 AM     Lab Results   Component Value Date/Time     08/25/2023 01:35 PM    K 4.0 08/25/2023 01:35 PM     08/25/2023 01:35 PM    CO2 22 08/19/2022 01:47 PM    BUN 12 08/19/2022 01:47 PM    CREATININE 0.8 06/24/2024 11:45 AM    CREATININE 0.84 08/25/2023 01:35 PM    GFRAA >60.0 08/19/2022 01:47 PM    AGRATIO 1.7 04/25/2018 08:49 AM    LABGLOM 76 06/24/2024 11:45 AM    GLUCOSE 92 08/25/2023 01:35 PM    CALCIUM 10.1 08/25/2023 01:35 PM    BILITOT 1.0 08/25/2023 01:35 PM    ALKPHOS 64 08/25/2023 01:35 PM    AST 22 08/25/2023 01:35 PM    ALT 25 08/25/2023 01:35 PM     No results found for: \"PROTIME\", \"INR\"  Lab Results   Component Value Date/Time    TSH 2.969 12/05/2011 07:21 AM    CGQUWRVH78 558 08/19/2022 12:59 PM    FOLATE >20.0 08/19/2022 12:59 PM    FERRITIN 36 12/05/2011 07:21 AM    IRON 101 12/05/2011 07:21 AM    TIBC 330 12/05/2011 07:21 AM     Lab Results   Component Value Date/Time    TRIG 115 08/25/2023 01:35 PM    HDL 72.8 08/25/2023 01:35 PM     No results found for: \"LABAMPH\", \"BARBSCNU\", \"LABBENZ\", \"CANNAB\", \"LABMETH\", \"PPXUR\", \"ETOH\"  No results found for: \"LITHIUM\", \"DILFRTOT\", \"VALPROATE\"    Assessment:       Diagnosis Orders   1. Cerebral aneurysm        2. Ocular migraine        3. Muscle tension headache        4. TIA (transient ischemic attack)        5. Aphasia        6. Abnormal CT of brain        Unruptured cerebral aneurysm in the right ICA cavernous sinus measuring 5 mm x 4 mm.  Patient already been seen by neurovascular team at [facility] as well.  We have followed this for a few years and this has not grown.  This is extra-axial does not require intervention.  Patient does not have high blood pressure is not a smoker and unlikely this aneurysm is going to grow.    Patient's aneurysm which was discovered incidentally when she had aphasia with a TIA and we had had a complete evaluation.  She is doing quite well on this.  She also has a history of ocular

## 2025-09-03 ENCOUNTER — APPOINTMENT (OUTPATIENT)
Dept: CARDIOLOGY | Facility: CLINIC | Age: 75
End: 2025-09-03
Payer: MEDICARE

## 2025-09-05 DIAGNOSIS — E78.2 MIXED HYPERLIPIDEMIA: ICD-10-CM

## 2025-09-05 RX ORDER — ATORVASTATIN CALCIUM 40 MG/1
40 TABLET, FILM COATED ORAL DAILY
Qty: 1 TABLET | Refills: 0 | OUTPATIENT
Start: 2025-09-05

## 2025-09-05 RX ORDER — CLOPIDOGREL BISULFATE 75 MG/1
75 TABLET ORAL EVERY MORNING
Qty: 90 TABLET | Refills: 2 | Status: SHIPPED | OUTPATIENT
Start: 2025-09-05

## 2025-09-05 RX ORDER — EZETIMIBE 10 MG/1
10 TABLET ORAL NIGHTLY
Qty: 1 TABLET | Refills: 0 | OUTPATIENT
Start: 2025-09-05

## 2025-09-10 ENCOUNTER — APPOINTMENT (OUTPATIENT)
Dept: CARDIOLOGY | Facility: CLINIC | Age: 75
End: 2025-09-10
Payer: MEDICARE

## (undated) DEVICE — GLOVE ORTHO 8   MSG9480

## (undated) DEVICE — Device: Brand: ENDO SMARTCAP

## (undated) DEVICE — TUBING, SUCTION, 1/4" X 10', STRAIGHT: Brand: MEDLINE

## (undated) DEVICE — BRUSH ENDO CLN L90.5IN SHTH DIA1.7MM BRIST DIA5-7MM 2-6MM

## (undated) DEVICE — SINGLE PORT MANIFOLD: Brand: NEPTUNE 2

## (undated) DEVICE — TUBE SET 96 MM 64 MM H2O PERISTALTIC STD AUX CHANNEL

## (undated) DEVICE — ENDO CARRY-ON PROCEDURE KIT: Brand: ENDO CARRY-ON PROCEDURE KIT